# Patient Record
Sex: FEMALE | Race: WHITE
[De-identification: names, ages, dates, MRNs, and addresses within clinical notes are randomized per-mention and may not be internally consistent; named-entity substitution may affect disease eponyms.]

---

## 2019-10-01 PROBLEM — Z00.00 ENCOUNTER FOR PREVENTIVE HEALTH EXAMINATION: Status: ACTIVE | Noted: 2019-10-01

## 2019-10-02 ENCOUNTER — APPOINTMENT (OUTPATIENT)
Dept: GYNECOLOGIC ONCOLOGY | Facility: CLINIC | Age: 54
End: 2019-10-02
Payer: MEDICAID

## 2019-10-02 DIAGNOSIS — Z86.39 PERSONAL HISTORY OF OTHER ENDOCRINE, NUTRITIONAL AND METABOLIC DISEASE: ICD-10-CM

## 2019-10-02 DIAGNOSIS — Z80.0 FAMILY HISTORY OF MALIGNANT NEOPLASM OF DIGESTIVE ORGANS: ICD-10-CM

## 2019-10-02 DIAGNOSIS — Z86.2 PERSONAL HISTORY OF DISEASES OF THE BLOOD AND BLOOD-FORMING ORGANS AND CERTAIN DISORDERS INVOLVING THE IMMUNE MECHANISM: ICD-10-CM

## 2019-10-02 DIAGNOSIS — Z78.9 OTHER SPECIFIED HEALTH STATUS: ICD-10-CM

## 2019-10-02 DIAGNOSIS — Z86.79 PERSONAL HISTORY OF OTHER DISEASES OF THE CIRCULATORY SYSTEM: ICD-10-CM

## 2019-10-02 PROCEDURE — 99205 OFFICE O/P NEW HI 60 MIN: CPT

## 2019-10-02 RX ORDER — METFORMIN HYDROCHLORIDE 625 MG/1
TABLET ORAL
Refills: 0 | Status: ACTIVE | COMMUNITY

## 2019-10-02 RX ORDER — GLIPIZIDE 2.5 MG/1
TABLET ORAL
Refills: 0 | Status: ACTIVE | COMMUNITY

## 2019-10-02 RX ORDER — METOPROLOL TARTRATE 75 MG/1
TABLET, FILM COATED ORAL
Refills: 0 | Status: ACTIVE | COMMUNITY

## 2019-10-02 RX ORDER — LEVOTHYROXINE SODIUM 137 UG/1
TABLET ORAL
Refills: 0 | Status: ACTIVE | COMMUNITY

## 2019-10-02 NOTE — OB HISTORY
[Total Preg ___] : : [unfilled] [AB Spont ___] : [unfilled] miscarriage(s) [Definite ___ (Date)] : the last menstrual period was [unfilled]

## 2019-10-11 LAB
CA 125 (LABCORP): 14.2 U/ML
HE 4: 108 PMOL/L
POSTMENOPAUSAL ROMA: 2.18
PREMENOPAUSAL ROMA: 3.34
ROMA COMMENT: NORMAL

## 2019-10-14 ENCOUNTER — APPOINTMENT (OUTPATIENT)
Dept: MAMMOGRAPHY | Facility: CLINIC | Age: 54
End: 2019-10-14
Payer: MEDICAID

## 2019-10-14 ENCOUNTER — OUTPATIENT (OUTPATIENT)
Dept: OUTPATIENT SERVICES | Facility: HOSPITAL | Age: 54
LOS: 1 days | End: 2019-10-14
Payer: MEDICAID

## 2019-10-14 ENCOUNTER — APPOINTMENT (OUTPATIENT)
Dept: MRI IMAGING | Facility: CLINIC | Age: 54
End: 2019-10-14
Payer: MEDICAID

## 2019-10-14 DIAGNOSIS — N83.8 OTHER NONINFLAMMATORY DISORDERS OF OVARY, FALLOPIAN TUBE AND BROAD LIGAMENT: ICD-10-CM

## 2019-10-14 PROCEDURE — 77067 SCR MAMMO BI INCL CAD: CPT | Mod: 26

## 2019-10-14 PROCEDURE — 77063 BREAST TOMOSYNTHESIS BI: CPT | Mod: 26

## 2019-10-14 PROCEDURE — 72197 MRI PELVIS W/O & W/DYE: CPT

## 2019-10-14 PROCEDURE — 77067 SCR MAMMO BI INCL CAD: CPT

## 2019-10-14 PROCEDURE — 77063 BREAST TOMOSYNTHESIS BI: CPT

## 2019-10-14 PROCEDURE — A9585: CPT

## 2019-10-14 PROCEDURE — 72197 MRI PELVIS W/O & W/DYE: CPT | Mod: 26

## 2019-10-16 ENCOUNTER — APPOINTMENT (OUTPATIENT)
Dept: GYNECOLOGIC ONCOLOGY | Facility: CLINIC | Age: 54
End: 2019-10-16
Payer: MEDICAID

## 2019-10-16 ENCOUNTER — TRANSCRIPTION ENCOUNTER (OUTPATIENT)
Age: 54
End: 2019-10-16

## 2019-10-16 VITALS
HEIGHT: 62 IN | BODY MASS INDEX: 39.01 KG/M2 | SYSTOLIC BLOOD PRESSURE: 130 MMHG | DIASTOLIC BLOOD PRESSURE: 76 MMHG | WEIGHT: 212 LBS

## 2019-10-16 DIAGNOSIS — N83.8 OTHER NONINFLAMMATORY DISORDERS OF OVARY, FALLOPIAN TUBE AND BROAD LIGAMENT: ICD-10-CM

## 2019-10-16 DIAGNOSIS — F17.200 NICOTINE DEPENDENCE, UNSPECIFIED, UNCOMPLICATED: ICD-10-CM

## 2019-10-16 PROCEDURE — 99215 OFFICE O/P EST HI 40 MIN: CPT

## 2019-10-16 NOTE — CHIEF COMPLAINT
[FreeTextEntry1] : [place] [facility]\par \par [facility] [phone] at [address] [place] [address]\par

## 2019-10-16 NOTE — HISTORY OF PRESENT ILLNESS
[FreeTextEntry1] : Pt is a 55 yo with a left adnexal solid mass presenting for MRI findings and HUDSON score. She reports that she has a stress test coming up next week and has follow up with pediatrist and GI doctor as discussed last visit. She reports her bloating has improved and resolved. The swelling has also subsided and she generally feels better. She has no new complaints.

## 2019-10-16 NOTE — END OF VISIT
[FreeTextEntry3] : Written by Nery Kruger PA-C, acting as scribe for Dr. Calvin Carlson.\par  [FreeTextEntry2] : This note accurately reflects the work and decisions made by me.\par

## 2019-10-16 NOTE — ASSESSMENT
[FreeTextEntry1] : Pt is a 55 yo with left solid adnexal mass and pelvic lymphadenopathy in the setting of normal HUDSON, and chronic skin disease of both lower extremities.

## 2019-10-16 NOTE — LETTER BODY
[FreeTextEntry2] : Name: TI SINGH \par : 1965 \par \par Date of Visit: Oct 02, 2019 \par \par Dear Dr. Cuellar \par \par I recently saw our mutual patient, TI SINGH , in my office.\par \par Below, please see my findings.\par \par As always, it is my sincere pleasure to have the opportunity to participate in one of your patients' care. Please feel free to contact me with any questions or concerns that you may have regarding her care.\par \par Sincerely yours,\par \par Calvin Carlson MD\par

## 2019-10-16 NOTE — REVIEW OF SYSTEMS
[Negative] : Genitourinary [Bloody Stools] : no bloody stools [Diarrhea] : no diarrhea [Nausea] : no nausea/vomitting [de-identified] : + abdominal bloating

## 2019-10-16 NOTE — END OF VISIT
[FreeTextEntry3] : RTC in 1 month for US and repeat HUDSON testing before clinic [>50% of Time Spent on Counseling for ____] : Greater than 50% of the encounter time was spent on counseling for [unfilled]

## 2019-10-16 NOTE — ASSESSMENT
[FreeTextEntry1] : 53 yo with left sided adnexal mass.\par \par I discussed with the patient that I would like to obtain further testing to evaluate ovarian mass. Will obtain MRI and HUDSON. I also discussed with the patient the importance of getting her colonsocopy performed in the setting of her history of anemia. Her father passed away from colon cancer and my first worry for her would be to make sure she does not have any evidence of colon cancer. Anemia in post-menopausal woman with no evidence of bleeding is concerning for a GI loss of blood. \par \par

## 2019-10-16 NOTE — DISCUSSION/SUMMARY
[FreeTextEntry1] : Discussed at length the complicated clinical situatiuon and reviewed the HUDSON score and MRI. 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 Solid masses are usually not malignant although it cannot be completely excluded. I would give it a 10-15% chance of malignancy at this time. If the lymphadenopathy does represent malignancy it is already spread and stage III. The options presented were exploratory laparotomy with hysterectomy, BSO and possible staging as the standard of care. I also discussed minimally invasive option with robotic surgery. She could also opt for surveillance if she is motivated not to have surgery and understands that delaying surgery could result in cancer spreading. In the end, she decided to undergo stress testing and clearances and follow up in 1 month for repeat US and repeat HUDSON testing. If there is stability she would like to continue observation as she is motivated to avoid surgery. She understands increased risk of complications due to Diabetes and being a smoker.

## 2019-10-16 NOTE — PHYSICAL EXAM
[Normal] : Bimanual Exam: Normal [Abnormal] : Skin and subcutaneous tissue: Abnormal [de-identified] : scarily dry skin on bilateral legs to the level of the knee

## 2019-10-25 ENCOUNTER — NON-APPOINTMENT (OUTPATIENT)
Age: 54
End: 2019-10-25

## 2019-10-25 ENCOUNTER — APPOINTMENT (OUTPATIENT)
Dept: CARDIOLOGY | Facility: CLINIC | Age: 54
End: 2019-10-25
Payer: MEDICAID

## 2019-10-25 VITALS
HEIGHT: 62 IN | SYSTOLIC BLOOD PRESSURE: 178 MMHG | HEART RATE: 98 BPM | OXYGEN SATURATION: 95 % | DIASTOLIC BLOOD PRESSURE: 90 MMHG

## 2019-10-25 DIAGNOSIS — I50.30 UNSPECIFIED DIASTOLIC (CONGESTIVE) HEART FAILURE: ICD-10-CM

## 2019-10-25 DIAGNOSIS — F17.210 NICOTINE DEPENDENCE, CIGARETTES, UNCOMPLICATED: ICD-10-CM

## 2019-10-25 DIAGNOSIS — E11.628 TYPE 2 DIABETES MELLITUS WITH OTHER SKIN COMPLICATIONS: ICD-10-CM

## 2019-10-25 DIAGNOSIS — R60.0 LOCALIZED EDEMA: ICD-10-CM

## 2019-10-25 DIAGNOSIS — I31.3 PERICARDIAL EFFUSION (NONINFLAMMATORY): ICD-10-CM

## 2019-10-25 PROCEDURE — 93000 ELECTROCARDIOGRAM COMPLETE: CPT

## 2019-10-25 PROCEDURE — 99204 OFFICE O/P NEW MOD 45 MIN: CPT | Mod: 25

## 2019-10-25 RX ORDER — LOSARTAN POTASSIUM 50 MG/1
50 TABLET, FILM COATED ORAL
Refills: 0 | Status: ACTIVE | COMMUNITY
Start: 2019-10-25

## 2019-10-25 RX ORDER — FUROSEMIDE 40 MG/1
40 TABLET ORAL
Qty: 30 | Refills: 0 | Status: ACTIVE | COMMUNITY
Start: 2019-10-24

## 2019-10-25 NOTE — PHYSICAL EXAM
[Normal Appearance] : normal appearance [General Appearance - Well Developed] : well developed [Well Groomed] : well groomed [General Appearance - Well Nourished] : well nourished [No Deformities] : no deformities [General Appearance - In No Acute Distress] : no acute distress [Normal Conjunctiva] : the conjunctiva exhibited no abnormalities [Eyelids - No Xanthelasma] : the eyelids demonstrated no xanthelasmas [Normal Oral Mucosa] : normal oral mucosa [No Oral Pallor] : no oral pallor [Heart Rate And Rhythm] : heart rate and rhythm were normal [No Oral Cyanosis] : no oral cyanosis [Heart Sounds] : normal S1 and S2 [Murmurs] : no murmurs present [Arterial Pulses Normal] : the arterial pulses were normal [Respiration, Rhythm And Depth] : normal respiratory rhythm and effort [Auscultation Breath Sounds / Voice Sounds] : lungs were clear to auscultation bilaterally [Exaggerated Use Of Accessory Muscles For Inspiration] : no accessory muscle use [Abdomen Tenderness] : non-tender [Abdomen Soft] : soft [Abdomen Mass (___ Cm)] : no abdominal mass palpated [Abnormal Walk] : normal gait [Gait - Sufficient For Exercise Testing] : the gait was sufficient for exercise testing [Nail Clubbing] : no clubbing of the fingernails [Petechial Hemorrhages (___cm)] : no petechial hemorrhages [Cyanosis, Localized] : no localized cyanosis [] : no ischemic changes [Oriented To Time, Place, And Person] : oriented to person, place, and time [Impaired Insight] : insight and judgment were intact [Affect] : the affect was normal [Mood] : the mood was normal [No Anxiety] : not feeling anxious [FreeTextEntry1] : b/l edema

## 2019-10-25 NOTE — HISTORY OF PRESENT ILLNESS
[FreeTextEntry1] : Pt is a 53 y/o F smoker who is referred here today by their PCP for evaluation.  A few mnths ago was feeling bloated.  Her PCP (Dr Chuck Cuellar) sent her for CXR, CT chest - was found to have pericardial effusion. Sent for TTE 09/2019 EF 60%, small pericardial effusion, mod LAE, mod TR, mod pHTN.  \par She states that her breathing has gotten better after starting diuretics.  She lives a sedentary lifestyle.\par She was also sent for a treadmill stress test which she could not complete due to SOB and leg pain - suboptimal test.  She was referred for pharm nuc but she did nto want to go back \par Was seeing a cardiologist in Dr Gloria's office\par \par She states last A1c 4.9\par \par PMH: pericardial effusion, HFpEF, HTN, DM, BMI 38,  hypothyroidism\par Smoking status: 1-1.5 PPD\par social ETOH \par no drug use\par Current exercise: none\par Daily water intake: "not enough"\par Daily caffeine intake: 2-3 cups coffee\par OTC medications: none\par Family hx: no cardiac disease\par Previous cardiac testing: as mentioned\par Previous hospitalizations: none\par 0 children\par LMP about age 44

## 2019-10-25 NOTE — REVIEW OF SYSTEMS
[Negative] : Endocrine [see HPI] : see HPI [Dyspnea on exertion] : dyspnea during exertion [Lower Ext Edema] : lower extremity edema

## 2019-10-25 NOTE — DISCUSSION/SUMMARY
[FreeTextEntry1] : Pt is a 55 y/o F smoker with PMH pericardial effusion, HFpEF, HTN, DM, BMI 38,  hypothyroidism who presents today for evaluation\par c/w lasix 40mg qd\par She has been sent for cardiac CTA by PCP - will f/u results\par TTE 09/2019 EF 60%, small pericardial effusion, mod LAE, mod TR, mod pHTN.\par BP very high today but she states it is usually well controlled, she did not take her meds this morning - will monitor and titrat antiHTN PRN\par f/u 2 weeks\par The described plan was discussed with the pt.  All questions and concerns were addressed to the best of my knowledge.

## 2019-11-01 ENCOUNTER — APPOINTMENT (OUTPATIENT)
Dept: OBGYN | Facility: CLINIC | Age: 54
End: 2019-11-01

## 2019-11-05 ENCOUNTER — APPOINTMENT (OUTPATIENT)
Dept: CARDIOLOGY | Facility: CLINIC | Age: 54
End: 2019-11-05

## 2019-11-11 ENCOUNTER — APPOINTMENT (OUTPATIENT)
Dept: GASTROENTEROLOGY | Facility: CLINIC | Age: 54
End: 2019-11-11

## 2019-11-14 LAB
CA 125 (LABCORP): 18 U/ML
HE 4: 104 PMOL/L
POSTMENOPAUSAL ROMA: 2.42
PREMENOPAUSAL ROMA: 3.17
ROMA COMMENT: NORMAL

## 2019-12-18 ENCOUNTER — APPOINTMENT (OUTPATIENT)
Dept: OBGYN | Facility: CLINIC | Age: 54
End: 2019-12-18

## 2020-01-02 ENCOUNTER — APPOINTMENT (OUTPATIENT)
Dept: GYNECOLOGIC ONCOLOGY | Facility: CLINIC | Age: 55
End: 2020-01-02

## 2020-01-29 ENCOUNTER — APPOINTMENT (OUTPATIENT)
Dept: GYNECOLOGIC ONCOLOGY | Facility: CLINIC | Age: 55
End: 2020-01-29

## 2020-02-05 ENCOUNTER — APPOINTMENT (OUTPATIENT)
Dept: GYNECOLOGIC ONCOLOGY | Facility: CLINIC | Age: 55
End: 2020-02-05

## 2020-02-26 ENCOUNTER — APPOINTMENT (OUTPATIENT)
Dept: GYNECOLOGIC ONCOLOGY | Facility: CLINIC | Age: 55
End: 2020-02-26

## 2020-03-04 ENCOUNTER — APPOINTMENT (OUTPATIENT)
Dept: GYNECOLOGIC ONCOLOGY | Facility: CLINIC | Age: 55
End: 2020-03-04

## 2020-03-18 ENCOUNTER — APPOINTMENT (OUTPATIENT)
Dept: GYNECOLOGIC ONCOLOGY | Facility: CLINIC | Age: 55
End: 2020-03-18

## 2021-07-25 ENCOUNTER — INPATIENT (INPATIENT)
Facility: HOSPITAL | Age: 56
LOS: 5 days | Discharge: HOME HEALTH | DRG: 720 | End: 2021-07-31
Attending: FAMILY MEDICINE | Admitting: INTERNAL MEDICINE
Payer: MEDICAID

## 2021-07-25 VITALS
WEIGHT: 199.96 LBS | HEIGHT: 62 IN | SYSTOLIC BLOOD PRESSURE: 113 MMHG | HEART RATE: 81 BPM | TEMPERATURE: 98 F | DIASTOLIC BLOOD PRESSURE: 67 MMHG | RESPIRATION RATE: 16 BRPM | OXYGEN SATURATION: 99 %

## 2021-07-25 DIAGNOSIS — L03.119 CELLULITIS OF UNSPECIFIED PART OF LIMB: ICD-10-CM

## 2021-07-25 LAB
ALBUMIN SERPL ELPH-MCNC: 2.6 G/DL — LOW (ref 3.3–5)
ALP SERPL-CCNC: 314 U/L — HIGH (ref 40–120)
ALT FLD-CCNC: 22 U/L — SIGNIFICANT CHANGE UP (ref 12–78)
ANION GAP SERPL CALC-SCNC: 5 MMOL/L — SIGNIFICANT CHANGE UP (ref 5–17)
ANISOCYTOSIS BLD QL: SIGNIFICANT CHANGE UP
APPEARANCE UR: ABNORMAL
APTT BLD: 38.2 SEC — HIGH (ref 27.5–35.5)
AST SERPL-CCNC: 43 U/L — HIGH (ref 15–37)
BACTERIA # UR AUTO: ABNORMAL
BASE EXCESS BLDV CALC-SCNC: -19 MMOL/L — LOW (ref -2–2)
BASOPHILS # BLD AUTO: 0.05 K/UL — SIGNIFICANT CHANGE UP (ref 0–0.2)
BASOPHILS NFR BLD AUTO: 0.5 % — SIGNIFICANT CHANGE UP (ref 0–2)
BILIRUB SERPL-MCNC: 0.4 MG/DL — SIGNIFICANT CHANGE UP (ref 0.2–1.2)
BILIRUB UR-MCNC: NEGATIVE — SIGNIFICANT CHANGE UP
BUN SERPL-MCNC: 67 MG/DL — HIGH (ref 7–23)
CALCIUM SERPL-MCNC: 7.2 MG/DL — LOW (ref 8.5–10.1)
CHLORIDE SERPL-SCNC: 117 MMOL/L — HIGH (ref 96–108)
CO2 SERPL-SCNC: 12 MMOL/L — LOW (ref 22–31)
COLOR SPEC: YELLOW — SIGNIFICANT CHANGE UP
COMMENT - URINE: SIGNIFICANT CHANGE UP
CREAT SERPL-MCNC: 2.9 MG/DL — HIGH (ref 0.5–1.3)
DACRYOCYTES BLD QL SMEAR: SLIGHT — SIGNIFICANT CHANGE UP
DIFF PNL FLD: NEGATIVE — SIGNIFICANT CHANGE UP
ELLIPTOCYTES BLD QL SMEAR: SLIGHT — SIGNIFICANT CHANGE UP
EOSINOPHIL # BLD AUTO: 0.32 K/UL — SIGNIFICANT CHANGE UP (ref 0–0.5)
EOSINOPHIL NFR BLD AUTO: 3.2 % — SIGNIFICANT CHANGE UP (ref 0–6)
EPI CELLS # UR: SIGNIFICANT CHANGE UP
GLUCOSE BLDC GLUCOMTR-MCNC: 82 MG/DL — SIGNIFICANT CHANGE UP (ref 70–99)
GLUCOSE SERPL-MCNC: 42 MG/DL — CRITICAL LOW (ref 70–99)
GLUCOSE UR QL: NEGATIVE MG/DL — SIGNIFICANT CHANGE UP
HCO3 BLDV-SCNC: 9 MMOL/L — LOW (ref 21–29)
HCT VFR BLD CALC: 29.1 % — LOW (ref 34.5–45)
HGB BLD-MCNC: 8.5 G/DL — LOW (ref 11.5–15.5)
HYPOCHROMIA BLD QL: SLIGHT — SIGNIFICANT CHANGE UP
IMM GRANULOCYTES NFR BLD AUTO: 0.7 % — SIGNIFICANT CHANGE UP (ref 0–1.5)
INR BLD: 1.19 RATIO — HIGH (ref 0.88–1.16)
KETONES UR-MCNC: NEGATIVE — SIGNIFICANT CHANGE UP
LACTATE SERPL-SCNC: 0.5 MMOL/L — LOW (ref 0.7–2)
LEUKOCYTE ESTERASE UR-ACNC: NEGATIVE — SIGNIFICANT CHANGE UP
LYMPHOCYTES # BLD AUTO: 0.8 K/UL — LOW (ref 1–3.3)
LYMPHOCYTES # BLD AUTO: 8.1 % — LOW (ref 13–44)
MANUAL SMEAR VERIFICATION: SIGNIFICANT CHANGE UP
MCHC RBC-ENTMCNC: 19.7 PG — LOW (ref 27–34)
MCHC RBC-ENTMCNC: 29.2 GM/DL — LOW (ref 32–36)
MCV RBC AUTO: 67.4 FL — LOW (ref 80–100)
MICROCYTES BLD QL: SLIGHT — SIGNIFICANT CHANGE UP
MONOCYTES # BLD AUTO: 0.7 K/UL — SIGNIFICANT CHANGE UP (ref 0–0.9)
MONOCYTES NFR BLD AUTO: 7.1 % — SIGNIFICANT CHANGE UP (ref 2–14)
NEUTROPHILS # BLD AUTO: 7.98 K/UL — HIGH (ref 1.8–7.4)
NEUTROPHILS NFR BLD AUTO: 80.4 % — HIGH (ref 43–77)
NITRITE UR-MCNC: NEGATIVE — SIGNIFICANT CHANGE UP
OVALOCYTES BLD QL SMEAR: SLIGHT — SIGNIFICANT CHANGE UP
PCO2 BLDV: 33 MMHG — LOW (ref 35–50)
PH BLDV: 7.09 — CRITICAL LOW (ref 7.35–7.45)
PH UR: 5 — SIGNIFICANT CHANGE UP (ref 5–8)
PLAT MORPH BLD: NORMAL — SIGNIFICANT CHANGE UP
PLATELET # BLD AUTO: 186 K/UL — SIGNIFICANT CHANGE UP (ref 150–400)
PO2 BLDV: 117 MMHG — HIGH (ref 25–45)
POIKILOCYTOSIS BLD QL AUTO: SLIGHT — SIGNIFICANT CHANGE UP
POLYCHROMASIA BLD QL SMEAR: SLIGHT — SIGNIFICANT CHANGE UP
POTASSIUM SERPL-MCNC: 6.5 MMOL/L — CRITICAL HIGH (ref 3.5–5.3)
POTASSIUM SERPL-SCNC: 6.5 MMOL/L — CRITICAL HIGH (ref 3.5–5.3)
PROT SERPL-MCNC: 8.6 GM/DL — HIGH (ref 6–8.3)
PROT UR-MCNC: 30 MG/DL
PROTHROM AB SERPL-ACNC: 13.8 SEC — HIGH (ref 10.6–13.6)
RBC # BLD: 4.32 M/UL — SIGNIFICANT CHANGE UP (ref 3.8–5.2)
RBC # FLD: 20.7 % — HIGH (ref 10.3–14.5)
RBC BLD AUTO: ABNORMAL
RBC CASTS # UR COMP ASSIST: SIGNIFICANT CHANGE UP /HPF (ref 0–4)
SAO2 % BLDV: 97 % — HIGH (ref 67–88)
SODIUM SERPL-SCNC: 134 MMOL/L — LOW (ref 135–145)
SP GR SPEC: 1.02 — SIGNIFICANT CHANGE UP (ref 1.01–1.02)
TARGETS BLD QL SMEAR: SLIGHT — SIGNIFICANT CHANGE UP
UROBILINOGEN FLD QL: NEGATIVE MG/DL — SIGNIFICANT CHANGE UP
WBC # BLD: 9.92 K/UL — SIGNIFICANT CHANGE UP (ref 3.8–10.5)
WBC # FLD AUTO: 9.92 K/UL — SIGNIFICANT CHANGE UP (ref 3.8–10.5)
WBC UR QL: SIGNIFICANT CHANGE UP

## 2021-07-25 PROCEDURE — 94640 AIRWAY INHALATION TREATMENT: CPT

## 2021-07-25 PROCEDURE — 97116 GAIT TRAINING THERAPY: CPT | Mod: GP

## 2021-07-25 PROCEDURE — 82272 OCCULT BLD FECES 1-3 TESTS: CPT

## 2021-07-25 PROCEDURE — 84156 ASSAY OF PROTEIN URINE: CPT

## 2021-07-25 PROCEDURE — 83036 HEMOGLOBIN GLYCOSYLATED A1C: CPT

## 2021-07-25 PROCEDURE — 84155 ASSAY OF PROTEIN SERUM: CPT

## 2021-07-25 PROCEDURE — 82330 ASSAY OF CALCIUM: CPT

## 2021-07-25 PROCEDURE — 83735 ASSAY OF MAGNESIUM: CPT

## 2021-07-25 PROCEDURE — 86769 SARS-COV-2 COVID-19 ANTIBODY: CPT

## 2021-07-25 PROCEDURE — 82607 VITAMIN B-12: CPT

## 2021-07-25 PROCEDURE — 82962 GLUCOSE BLOOD TEST: CPT

## 2021-07-25 PROCEDURE — 84165 PROTEIN E-PHORESIS SERUM: CPT

## 2021-07-25 PROCEDURE — 86803 HEPATITIS C AB TEST: CPT

## 2021-07-25 PROCEDURE — 87493 C DIFF AMPLIFIED PROBE: CPT

## 2021-07-25 PROCEDURE — 74176 CT ABD & PELVIS W/O CONTRAST: CPT | Mod: 26

## 2021-07-25 PROCEDURE — 86335 IMMUNFIX E-PHORSIS/URINE/CSF: CPT

## 2021-07-25 PROCEDURE — 99291 CRITICAL CARE FIRST HOUR: CPT

## 2021-07-25 PROCEDURE — 80069 RENAL FUNCTION PANEL: CPT

## 2021-07-25 PROCEDURE — 83550 IRON BINDING TEST: CPT

## 2021-07-25 PROCEDURE — 85730 THROMBOPLASTIN TIME PARTIAL: CPT

## 2021-07-25 PROCEDURE — 93010 ELECTROCARDIOGRAM REPORT: CPT

## 2021-07-25 PROCEDURE — 82310 ASSAY OF CALCIUM: CPT

## 2021-07-25 PROCEDURE — 71045 X-RAY EXAM CHEST 1 VIEW: CPT | Mod: 26

## 2021-07-25 PROCEDURE — 82728 ASSAY OF FERRITIN: CPT

## 2021-07-25 PROCEDURE — 82550 ASSAY OF CK (CPK): CPT

## 2021-07-25 PROCEDURE — 80048 BASIC METABOLIC PNL TOTAL CA: CPT

## 2021-07-25 PROCEDURE — 80202 ASSAY OF VANCOMYCIN: CPT

## 2021-07-25 PROCEDURE — 93005 ELECTROCARDIOGRAM TRACING: CPT

## 2021-07-25 PROCEDURE — 36415 COLL VENOUS BLD VENIPUNCTURE: CPT

## 2021-07-25 PROCEDURE — 84100 ASSAY OF PHOSPHORUS: CPT

## 2021-07-25 PROCEDURE — 85610 PROTHROMBIN TIME: CPT

## 2021-07-25 PROCEDURE — 74176 CT ABD & PELVIS W/O CONTRAST: CPT

## 2021-07-25 PROCEDURE — 84681 ASSAY OF C-PEPTIDE: CPT

## 2021-07-25 PROCEDURE — 82306 VITAMIN D 25 HYDROXY: CPT

## 2021-07-25 PROCEDURE — 86334 IMMUNOFIX E-PHORESIS SERUM: CPT

## 2021-07-25 PROCEDURE — 84484 ASSAY OF TROPONIN QUANT: CPT

## 2021-07-25 PROCEDURE — 85027 COMPLETE CBC AUTOMATED: CPT

## 2021-07-25 PROCEDURE — 93970 EXTREMITY STUDY: CPT

## 2021-07-25 PROCEDURE — 83540 ASSAY OF IRON: CPT

## 2021-07-25 PROCEDURE — 83970 ASSAY OF PARATHORMONE: CPT

## 2021-07-25 PROCEDURE — 93925 LOWER EXTREMITY STUDY: CPT

## 2021-07-25 RX ORDER — ALBUTEROL 90 UG/1
2 AEROSOL, METERED ORAL ONCE
Refills: 0 | Status: COMPLETED | OUTPATIENT
Start: 2021-07-25 | End: 2021-07-25

## 2021-07-25 RX ORDER — CEFTRIAXONE 500 MG/1
1000 INJECTION, POWDER, FOR SOLUTION INTRAMUSCULAR; INTRAVENOUS EVERY 24 HOURS
Refills: 0 | Status: DISCONTINUED | OUTPATIENT
Start: 2021-07-25 | End: 2021-07-25

## 2021-07-25 RX ORDER — SODIUM CHLORIDE 9 MG/ML
2000 INJECTION INTRAMUSCULAR; INTRAVENOUS; SUBCUTANEOUS ONCE
Refills: 0 | Status: COMPLETED | OUTPATIENT
Start: 2021-07-25 | End: 2021-07-25

## 2021-07-25 RX ORDER — CALCIUM GLUCONATE 100 MG/ML
1 VIAL (ML) INTRAVENOUS ONCE
Refills: 0 | Status: COMPLETED | OUTPATIENT
Start: 2021-07-25 | End: 2021-07-25

## 2021-07-25 RX ORDER — SODIUM BICARBONATE 1 MEQ/ML
0.33 SYRINGE (ML) INTRAVENOUS
Qty: 150 | Refills: 0 | Status: DISCONTINUED | OUTPATIENT
Start: 2021-07-25 | End: 2021-07-26

## 2021-07-25 RX ORDER — CEFTRIAXONE 500 MG/1
1000 INJECTION, POWDER, FOR SOLUTION INTRAMUSCULAR; INTRAVENOUS ONCE
Refills: 0 | Status: DISCONTINUED | OUTPATIENT
Start: 2021-07-25 | End: 2021-07-25

## 2021-07-25 RX ORDER — CHLORHEXIDINE GLUCONATE 213 G/1000ML
1 SOLUTION TOPICAL
Refills: 0 | Status: DISCONTINUED | OUTPATIENT
Start: 2021-07-25 | End: 2021-07-31

## 2021-07-25 RX ORDER — CEFTRIAXONE 500 MG/1
1000 INJECTION, POWDER, FOR SOLUTION INTRAMUSCULAR; INTRAVENOUS ONCE
Refills: 0 | Status: COMPLETED | OUTPATIENT
Start: 2021-07-25 | End: 2021-07-25

## 2021-07-25 RX ORDER — CEFTRIAXONE 500 MG/1
1000 INJECTION, POWDER, FOR SOLUTION INTRAMUSCULAR; INTRAVENOUS
Refills: 0 | Status: DISCONTINUED | OUTPATIENT
Start: 2021-07-25 | End: 2021-07-29

## 2021-07-25 RX ORDER — VANCOMYCIN HCL 1 G
1250 VIAL (EA) INTRAVENOUS ONCE
Refills: 0 | Status: COMPLETED | OUTPATIENT
Start: 2021-07-25 | End: 2021-07-25

## 2021-07-25 RX ORDER — SODIUM BICARBONATE 1 MEQ/ML
50 SYRINGE (ML) INTRAVENOUS ONCE
Refills: 0 | Status: COMPLETED | OUTPATIENT
Start: 2021-07-25 | End: 2021-07-25

## 2021-07-25 RX ADMIN — Medication 50 MILLILITER(S): at 22:52

## 2021-07-25 RX ADMIN — CEFTRIAXONE 1000 MILLIGRAM(S): 500 INJECTION, POWDER, FOR SOLUTION INTRAMUSCULAR; INTRAVENOUS at 21:27

## 2021-07-25 RX ADMIN — Medication 100 GRAM(S): at 23:03

## 2021-07-25 RX ADMIN — SODIUM CHLORIDE 2000 MILLILITER(S): 9 INJECTION INTRAMUSCULAR; INTRAVENOUS; SUBCUTANEOUS at 21:53

## 2021-07-25 RX ADMIN — ALBUTEROL 2 PUFF(S): 90 AEROSOL, METERED ORAL at 23:09

## 2021-07-25 NOTE — H&P ADULT - NSHPPHYSICALEXAM_GEN_ALL_CORE
General: obese, poorly kept. odorous.   Head: Normocephalic, atraumatic.   EENT: Sclera white. PERRL, EOM intact. Secretions normal. no cervical lymphadenopathy  PULM: Breath sounds clear to auscultation symmetrically throughout all lung fields. No wheezing, rhonchi, or rales. No use of accessory muscles.  CVS: Regular rate and rhythm. No murmurs or rubs. Pulses 2+ on upper and lower extremities bilaterally.   GI: nondistended with bowel sounds normoactive in all four quadrants. No tenderness to light or deep palpation.   Neuro:  A&OX 3. does not participate completely in medical history.   Skin: severe chronic venous stasis growths on both lower extremities with areas of wet discharge.

## 2021-07-25 NOTE — ED ADULT TRIAGE NOTE - CHIEF COMPLAINT QUOTE
Pt BIBEMS from home. called ambulance for lightheadedness, slurred speech. upon EMS arrival pt with blood glucose 56. received 1 amp dextrose PTA. bgm 84 in triage. pt is awake and alert. slurred speech and dizziness have resolved upon ED arrival.

## 2021-07-25 NOTE — ED PROVIDER NOTE - MUSCULOSKELETAL, MLM
Spine appears normal, range of motion is not limited, no muscle or joint tenderness +crusting to bilateral lower extremities  with mild swelling and pitting edema

## 2021-07-25 NOTE — ED PROVIDER NOTE - CARE PLAN
Principal Discharge DX:	Cellulitis of lower extremity, unspecified laterality  Secondary Diagnosis:	Hypoglycemia  Secondary Diagnosis:	Hyperkalemia  Secondary Diagnosis:	NEIL (acute kidney injury)

## 2021-07-25 NOTE — ED PROVIDER NOTE - CRITICAL CARE ATTENDING CONTRIBUTION TO CARE
Time spent discussing with consultants and patient and determining plan of care and interpreting labs.

## 2021-07-25 NOTE — ED PROVIDER NOTE - PROGRESS NOTE DETAILS
ICU consulted and to accept.  Pt reports she takes potassium supplements.  Dosed with calcium gluconate, bicarb, and albuterol.  Would not provide insulin given recurrent hypoglyemia.  Pt tolerating PO.  Perhaps etiology is sepsis from underlying skin infection.  ICU accepts case.  Further care per inpatient treatment team.

## 2021-07-25 NOTE — ED PROVIDER NOTE - OBJECTIVE STATEMENT
57 y/o female presents to ED for episode of hypoglycemia. Pt states she was slurring her speech, EMS checked bgm and it was in 50s, was given amp of d50 with immediate improvement. Pt felt at baseline after, states she hasn't been eating all day. Denies any sx currently other than mild diarrhea. On glipizide, took normal dose this morning and metformin. Not on insulin.

## 2021-07-25 NOTE — H&P ADULT - ASSESSMENT
57 y/o female with pmhx of DM now with:    1. acute renal failure  2. hypoglycemia  3. cellulitis  4. hyperkalemia      NEURO: mentating well. slurred speech resolved after D50  CVS: HD stable.  PULM: no active issues  GI: no active issues  RENAL: check CT abd/pelv r/o obstruction. likely related to dehydration. received 2 liters NS in ER. start on bicarb gtt. repeat bmp pending. if continues to worsen may end up requiring HD.   ID: cultures pending. UA pending. received rocephin and vanco in ER. lactate normal. afebrile without wbc but severe LE celluitis. start on rocephin for now. f/u with derm outpatient.    ENDO: hypoglycemia ? accidental with glipizide or related to sepsis? q1 hour finger sticks for now until stable. receiving D5 with bicarb gtt. check cpeptide levels.  HEME: heparin subq for dvt prophylaxis  DISPO: full code    35 minutes of critical care time spent providing medical care for patient's acute illness/conditions that impairs at least one vital organ system and/or poses a high risk of imminent or life threatening deterioration in the patient's condition. It includes time spent evaluating and treating the patient's acute illness as well as time spent reviewing labs, radiology, discussing goals of care with patient and/or patient's family, and discussing the case with a multidisciplinary team in an effort to prevent further life threatening deterioration or end organ damage. This time is independent of any procedures performed. 55 y/o female with pmhx of DM now with:    1. acute renal failure  2. hypoglycemia  3. cellulitis  4. hyperkalemia      NEURO: mentating well. slurred speech resolved after D50  CVS: HD stable.  PULM: no active issues  GI: no active issues  RENAL: check CT abd/pelv r/o obstruction. likely related to dehydration. received 2 liters NS in ER. start on bicarb gtt. repeat bmp pending. if continues to worsen may end up requiring HD.   ID: cultures pending. UA pending. received rocephin and vanco in ER. lactate normal. afebrile without wbc but severe LE celluitis. start on rocephin for now. f/u with derm outpatient.    ENDO: hypoglycemia ? accidental with glipizide or related to sepsis? q1 hour finger sticks for now until stable. receiving D5 with bicarb gtt. check cpeptide levels.  HEME: heparin subq for dvt prophylaxis  DISPO: full code    addendum 1: repeat bmp in ER with worsening hyperkalemia and Cr. arriola placed for critical I&O. give two amps d50 followed by insulin given recent hypoglycemia, lokelma, bicarb, increase bicarb gtt to 200, it was just started. will repeat bmp in 4 hours if no improvement may need HD overnight.    addendum 2: f/u bmp with improving potassium and acidosis though potassium remains elevated. gave additional dose of insulin/D50. started albuterol/budesonide/solumedrol as patient is wheezing a lot possible COPD flare. albuterol will also help with hyperK. urine output 40-50 cc/hr. repeat labs ordered for 9 am.    45minutes of critical care time spent providing medical care for patient's acute illness/conditions that impairs at least one vital organ system and/or poses a high risk of imminent or life threatening deterioration in the patient's condition. It includes time spent evaluating and treating the patient's acute illness as well as time spent reviewing labs, radiology, discussing goals of care with patient and/or patient's family, and discussing the case with a multidisciplinary team in an effort to prevent further life threatening deterioration or end organ damage. This time is independent of any procedures performed.

## 2021-07-25 NOTE — ED ADULT NURSE REASSESSMENT NOTE - NS ED NURSE REASSESS COMMENT FT1
Pt received at 2200. Pt AA&Ox4. Pt denies CP, SOB. Pt states she is having abdominal pain and diarrhea.

## 2021-07-25 NOTE — ED PROVIDER NOTE - ENMT, MLM
Airway patent, Nasal mucosa clear. Mouth with normal mucosa. Throat has no vesicles, no oropharyngeal exudates and uvula is midline.
Face Mask

## 2021-07-25 NOTE — H&P ADULT - HISTORY OF PRESENT ILLNESS
55 y/o female, poor historian, with pmhx of DM on glipizide presents to ER c/o weakness and lethargy. Found to be hypoglycemic to 50 in field, received amp of D50 and improved to 84 by arrival to ER. States she has been having diarrhea for last few days. Denies any changes in urination, abdominal pain, shortness of breath, chest pain, headache, fevers, cough.    States she took normal dose of glipizide this morning and has not had decreased PO intake. Denies any substance abuse or etoh abuse. Very poorly kept with severe venous stasis dermatitis. was on chronic keflex and states it was helping but she stopped taking it.     Found to be in acute renal failure with ph of 7.09, Cr 2.9, K 6.5, serum CO2 of 12. received calcium, albuterol, and bicarb in ER.

## 2021-07-25 NOTE — ED ADULT NURSE NOTE - OBJECTIVE STATEMENT
Patient BIBA from home complaining of hypoglycemia. Pt states she started experiencing lightheadedness, slurred speech. upon EMS arrival pt with blood glucose 56. received 1 amp dextrose PTA. bgm 84 in triage. pt is awake and alert. slurred speech and dizziness have resolved upon ED arrival. Patient states she is on both metformin and glipizide. Patient states she did not eat much today. Patient denies CP, SOB, fever, chills, dizziness. Patient BIBA from home complaining of hypoglycemia. Pt states she started experiencing lightheadedness, slurred speech. upon EMS arrival pt with blood glucose 56. received 1 amp dextrose PTA. bgm 84 in triage. pt is awake and alert. slurred speech and dizziness have resolved upon ED arrival. Patient states she is on both metformin and glipizide. Patient states she did not eat much today. Patient denies CP, SOB, fever, chills, dizziness. B/L crusting wounds, swelling. Patient states she was treated with oral ABX, wounds were improving, did not finish treatment.

## 2021-07-25 NOTE — ED PROVIDER NOTE - CLINICAL SUMMARY MEDICAL DECISION MAKING FREE TEXT BOX
Appears to have bilateral lower extremities cellulitis, discontinued keflex early despite initial improvement, labs with significant abnormalities including hyperkalemia, NEIL, recurrent hypoglycemia and low bicarb with acidosis, pt will require admission for further work up

## 2021-07-25 NOTE — H&P ADULT - REASON FOR ADMISSION
This RN was at the bedside continuously monitoring FHR from the time pushing began at 0100 to delivery of viable female infant at 0159. Apgars 9/9   acute renal failure

## 2021-07-26 LAB
ANION GAP SERPL CALC-SCNC: 5 MMOL/L — SIGNIFICANT CHANGE UP (ref 5–17)
ANION GAP SERPL CALC-SCNC: 6 MMOL/L — SIGNIFICANT CHANGE UP (ref 5–17)
ANION GAP SERPL CALC-SCNC: 8 MMOL/L — SIGNIFICANT CHANGE UP (ref 5–17)
ANION GAP SERPL CALC-SCNC: 9 MMOL/L — SIGNIFICANT CHANGE UP (ref 5–17)
BUN SERPL-MCNC: 61 MG/DL — HIGH (ref 7–23)
BUN SERPL-MCNC: 63 MG/DL — HIGH (ref 7–23)
BUN SERPL-MCNC: 64 MG/DL — HIGH (ref 7–23)
BUN SERPL-MCNC: 65 MG/DL — HIGH (ref 7–23)
BUN SERPL-MCNC: 65 MG/DL — HIGH (ref 7–23)
BUN SERPL-MCNC: 67 MG/DL — HIGH (ref 7–23)
C PEPTIDE SERPL-MCNC: 10.1 NG/ML — HIGH (ref 1.1–4.4)
CALCIUM SERPL-MCNC: 6.5 MG/DL — CRITICAL LOW (ref 8.5–10.1)
CALCIUM SERPL-MCNC: 6.9 MG/DL — LOW (ref 8.5–10.1)
CALCIUM SERPL-MCNC: 6.9 MG/DL — LOW (ref 8.5–10.1)
CALCIUM SERPL-MCNC: 7.1 MG/DL — LOW (ref 8.5–10.1)
CALCIUM SERPL-MCNC: 7.1 MG/DL — LOW (ref 8.5–10.1)
CALCIUM SERPL-MCNC: 7.2 MG/DL — LOW (ref 8.5–10.1)
CHLORIDE SERPL-SCNC: 104 MMOL/L — SIGNIFICANT CHANGE UP (ref 96–108)
CHLORIDE SERPL-SCNC: 111 MMOL/L — HIGH (ref 96–108)
CHLORIDE SERPL-SCNC: 111 MMOL/L — HIGH (ref 96–108)
CHLORIDE SERPL-SCNC: 113 MMOL/L — HIGH (ref 96–108)
CHLORIDE SERPL-SCNC: 115 MMOL/L — HIGH (ref 96–108)
CHLORIDE SERPL-SCNC: 118 MMOL/L — HIGH (ref 96–108)
CO2 SERPL-SCNC: 11 MMOL/L — LOW (ref 22–31)
CO2 SERPL-SCNC: 15 MMOL/L — LOW (ref 22–31)
CO2 SERPL-SCNC: 16 MMOL/L — LOW (ref 22–31)
CO2 SERPL-SCNC: 18 MMOL/L — LOW (ref 22–31)
CO2 SERPL-SCNC: 19 MMOL/L — LOW (ref 22–31)
CO2 SERPL-SCNC: 26 MMOL/L — SIGNIFICANT CHANGE UP (ref 22–31)
COVID-19 SPIKE DOMAIN AB INTERP: NEGATIVE — SIGNIFICANT CHANGE UP
COVID-19 SPIKE DOMAIN ANTIBODY RESULT: 0.4 U/ML — SIGNIFICANT CHANGE UP
CREAT SERPL-MCNC: 2.61 MG/DL — HIGH (ref 0.5–1.3)
CREAT SERPL-MCNC: 2.62 MG/DL — HIGH (ref 0.5–1.3)
CREAT SERPL-MCNC: 2.69 MG/DL — HIGH (ref 0.5–1.3)
CREAT SERPL-MCNC: 2.7 MG/DL — HIGH (ref 0.5–1.3)
CREAT SERPL-MCNC: 2.88 MG/DL — HIGH (ref 0.5–1.3)
CREAT SERPL-MCNC: 2.98 MG/DL — HIGH (ref 0.5–1.3)
GLUCOSE SERPL-MCNC: 161 MG/DL — HIGH (ref 70–99)
GLUCOSE SERPL-MCNC: 180 MG/DL — HIGH (ref 70–99)
GLUCOSE SERPL-MCNC: 251 MG/DL — HIGH (ref 70–99)
GLUCOSE SERPL-MCNC: 309 MG/DL — HIGH (ref 70–99)
GLUCOSE SERPL-MCNC: 598 MG/DL — CRITICAL HIGH (ref 70–99)
GLUCOSE SERPL-MCNC: 80 MG/DL — SIGNIFICANT CHANGE UP (ref 70–99)
MAGNESIUM SERPL-MCNC: 1.8 MG/DL — SIGNIFICANT CHANGE UP (ref 1.6–2.6)
MAGNESIUM SERPL-MCNC: 1.9 MG/DL — SIGNIFICANT CHANGE UP (ref 1.6–2.6)
MAGNESIUM SERPL-MCNC: 2 MG/DL — SIGNIFICANT CHANGE UP (ref 1.6–2.6)
PHOSPHATE SERPL-MCNC: 5.5 MG/DL — HIGH (ref 2.5–4.5)
PHOSPHATE SERPL-MCNC: 6.3 MG/DL — HIGH (ref 2.5–4.5)
PHOSPHATE SERPL-MCNC: 6.9 MG/DL — HIGH (ref 2.5–4.5)
POTASSIUM SERPL-MCNC: 4.9 MMOL/L — SIGNIFICANT CHANGE UP (ref 3.5–5.3)
POTASSIUM SERPL-MCNC: 5.4 MMOL/L — HIGH (ref 3.5–5.3)
POTASSIUM SERPL-MCNC: 6.1 MMOL/L — HIGH (ref 3.5–5.3)
POTASSIUM SERPL-MCNC: 6.3 MMOL/L — CRITICAL HIGH (ref 3.5–5.3)
POTASSIUM SERPL-MCNC: 6.6 MMOL/L — CRITICAL HIGH (ref 3.5–5.3)
POTASSIUM SERPL-MCNC: 7 MMOL/L — CRITICAL HIGH (ref 3.5–5.3)
POTASSIUM SERPL-SCNC: 4.9 MMOL/L — SIGNIFICANT CHANGE UP (ref 3.5–5.3)
POTASSIUM SERPL-SCNC: 5.4 MMOL/L — HIGH (ref 3.5–5.3)
POTASSIUM SERPL-SCNC: 6.1 MMOL/L — HIGH (ref 3.5–5.3)
POTASSIUM SERPL-SCNC: 6.3 MMOL/L — CRITICAL HIGH (ref 3.5–5.3)
POTASSIUM SERPL-SCNC: 6.6 MMOL/L — CRITICAL HIGH (ref 3.5–5.3)
POTASSIUM SERPL-SCNC: 7 MMOL/L — CRITICAL HIGH (ref 3.5–5.3)
RAPID RVP RESULT: SIGNIFICANT CHANGE UP
SARS-COV-2 IGG+IGM SERPL QL IA: 0.4 U/ML — SIGNIFICANT CHANGE UP
SARS-COV-2 IGG+IGM SERPL QL IA: NEGATIVE — SIGNIFICANT CHANGE UP
SARS-COV-2 RNA SPEC QL NAA+PROBE: SIGNIFICANT CHANGE UP
SODIUM SERPL-SCNC: 135 MMOL/L — SIGNIFICANT CHANGE UP (ref 135–145)
SODIUM SERPL-SCNC: 136 MMOL/L — SIGNIFICANT CHANGE UP (ref 135–145)
SODIUM SERPL-SCNC: 138 MMOL/L — SIGNIFICANT CHANGE UP (ref 135–145)
SODIUM SERPL-SCNC: 138 MMOL/L — SIGNIFICANT CHANGE UP (ref 135–145)
TROPONIN I SERPL-MCNC: 0.06 NG/ML — HIGH (ref 0.01–0.04)
TROPONIN I SERPL-MCNC: 0.13 NG/ML — HIGH (ref 0.01–0.04)
VANCOMYCIN TROUGH SERPL-MCNC: 14.7 UG/ML — SIGNIFICANT CHANGE UP (ref 10–20)

## 2021-07-26 PROCEDURE — 93010 ELECTROCARDIOGRAM REPORT: CPT

## 2021-07-26 PROCEDURE — 93925 LOWER EXTREMITY STUDY: CPT | Mod: 26

## 2021-07-26 PROCEDURE — 93970 EXTREMITY STUDY: CPT | Mod: 26

## 2021-07-26 PROCEDURE — 99222 1ST HOSP IP/OBS MODERATE 55: CPT

## 2021-07-26 PROCEDURE — 99291 CRITICAL CARE FIRST HOUR: CPT

## 2021-07-26 RX ORDER — DEXTROSE 50 % IN WATER 50 %
12.5 SYRINGE (ML) INTRAVENOUS ONCE
Refills: 0 | Status: DISCONTINUED | OUTPATIENT
Start: 2021-07-26 | End: 2021-07-31

## 2021-07-26 RX ORDER — SODIUM CHLORIDE 9 MG/ML
1000 INJECTION, SOLUTION INTRAVENOUS
Refills: 0 | Status: DISCONTINUED | OUTPATIENT
Start: 2021-07-26 | End: 2021-07-31

## 2021-07-26 RX ORDER — CALCIUM GLUCONATE 100 MG/ML
1 VIAL (ML) INTRAVENOUS ONCE
Refills: 0 | Status: COMPLETED | OUTPATIENT
Start: 2021-07-26 | End: 2021-07-26

## 2021-07-26 RX ORDER — BUDESONIDE AND FORMOTEROL FUMARATE DIHYDRATE 160; 4.5 UG/1; UG/1
2 AEROSOL RESPIRATORY (INHALATION)
Refills: 0 | Status: DISCONTINUED | OUTPATIENT
Start: 2021-07-26 | End: 2021-07-31

## 2021-07-26 RX ORDER — DEXTROSE 50 % IN WATER 50 %
15 SYRINGE (ML) INTRAVENOUS ONCE
Refills: 0 | Status: DISCONTINUED | OUTPATIENT
Start: 2021-07-26 | End: 2021-07-31

## 2021-07-26 RX ORDER — DEXTROSE 50 % IN WATER 50 %
50 SYRINGE (ML) INTRAVENOUS ONCE
Refills: 0 | Status: COMPLETED | OUTPATIENT
Start: 2021-07-26 | End: 2021-07-26

## 2021-07-26 RX ORDER — SOD,AMMONIUM,POTASSIUM LACTATE
1 CREAM (GRAM) TOPICAL
Refills: 0 | Status: DISCONTINUED | OUTPATIENT
Start: 2021-07-26 | End: 2021-07-31

## 2021-07-26 RX ORDER — SODIUM ZIRCONIUM CYCLOSILICATE 10 G/10G
10 POWDER, FOR SUSPENSION ORAL ONCE
Refills: 0 | Status: COMPLETED | OUTPATIENT
Start: 2021-07-26 | End: 2021-07-26

## 2021-07-26 RX ORDER — INSULIN HUMAN 100 [IU]/ML
10 INJECTION, SOLUTION SUBCUTANEOUS ONCE
Refills: 0 | Status: COMPLETED | OUTPATIENT
Start: 2021-07-26 | End: 2021-07-26

## 2021-07-26 RX ORDER — NYSTATIN CREAM 100000 [USP'U]/G
1 CREAM TOPICAL
Refills: 0 | Status: DISCONTINUED | OUTPATIENT
Start: 2021-07-26 | End: 2021-07-31

## 2021-07-26 RX ORDER — DEXTROSE 50 % IN WATER 50 %
25 SYRINGE (ML) INTRAVENOUS ONCE
Refills: 0 | Status: DISCONTINUED | OUTPATIENT
Start: 2021-07-26 | End: 2021-07-31

## 2021-07-26 RX ORDER — SODIUM POLYSTYRENE SULFONATE 4.1 MEQ/G
30 POWDER, FOR SUSPENSION ORAL ONCE
Refills: 0 | Status: COMPLETED | OUTPATIENT
Start: 2021-07-26 | End: 2021-07-26

## 2021-07-26 RX ORDER — VANCOMYCIN HCL 1 G
1000 VIAL (EA) INTRAVENOUS DAILY
Refills: 0 | Status: DISCONTINUED | OUTPATIENT
Start: 2021-07-26 | End: 2021-07-26

## 2021-07-26 RX ORDER — SODIUM BICARBONATE 1 MEQ/ML
50 SYRINGE (ML) INTRAVENOUS ONCE
Refills: 0 | Status: COMPLETED | OUTPATIENT
Start: 2021-07-26 | End: 2021-07-26

## 2021-07-26 RX ORDER — SODIUM BICARBONATE 1 MEQ/ML
0.17 SYRINGE (ML) INTRAVENOUS
Qty: 150 | Refills: 0 | Status: DISCONTINUED | OUTPATIENT
Start: 2021-07-26 | End: 2021-07-28

## 2021-07-26 RX ORDER — MAGNESIUM SULFATE 500 MG/ML
1 VIAL (ML) INJECTION ONCE
Refills: 0 | Status: COMPLETED | OUTPATIENT
Start: 2021-07-26 | End: 2021-07-26

## 2021-07-26 RX ORDER — GLUCAGON INJECTION, SOLUTION 0.5 MG/.1ML
1 INJECTION, SOLUTION SUBCUTANEOUS ONCE
Refills: 0 | Status: DISCONTINUED | OUTPATIENT
Start: 2021-07-26 | End: 2021-07-31

## 2021-07-26 RX ORDER — INSULIN HUMAN 100 [IU]/ML
5 INJECTION, SOLUTION SUBCUTANEOUS ONCE
Refills: 0 | Status: COMPLETED | OUTPATIENT
Start: 2021-07-26 | End: 2021-07-26

## 2021-07-26 RX ORDER — SODIUM CHLORIDE 9 MG/ML
1000 INJECTION INTRAMUSCULAR; INTRAVENOUS; SUBCUTANEOUS ONCE
Refills: 0 | Status: COMPLETED | OUTPATIENT
Start: 2021-07-26 | End: 2021-07-26

## 2021-07-26 RX ORDER — INSULIN LISPRO 100/ML
VIAL (ML) SUBCUTANEOUS
Refills: 0 | Status: DISCONTINUED | OUTPATIENT
Start: 2021-07-26 | End: 2021-07-27

## 2021-07-26 RX ORDER — ALBUTEROL 90 UG/1
2 AEROSOL, METERED ORAL EVERY 6 HOURS
Refills: 0 | Status: DISCONTINUED | OUTPATIENT
Start: 2021-07-26 | End: 2021-07-31

## 2021-07-26 RX ORDER — DEXTROSE 50 % IN WATER 50 %
25 SYRINGE (ML) INTRAVENOUS ONCE
Refills: 0 | Status: COMPLETED | OUTPATIENT
Start: 2021-07-26 | End: 2021-07-26

## 2021-07-26 RX ORDER — HEPARIN SODIUM 5000 [USP'U]/ML
5000 INJECTION INTRAVENOUS; SUBCUTANEOUS EVERY 8 HOURS
Refills: 0 | Status: DISCONTINUED | OUTPATIENT
Start: 2021-07-26 | End: 2021-07-31

## 2021-07-26 RX ADMIN — Medication 250 MILLIGRAM(S): at 00:43

## 2021-07-26 RX ADMIN — Medication 125 MILLIGRAM(S): at 05:25

## 2021-07-26 RX ADMIN — Medication 100 GRAM(S): at 00:49

## 2021-07-26 RX ADMIN — Medication 6: at 23:06

## 2021-07-26 RX ADMIN — CHLORHEXIDINE GLUCONATE 1 APPLICATION(S): 213 SOLUTION TOPICAL at 05:00

## 2021-07-26 RX ADMIN — INSULIN HUMAN 10 UNIT(S): 100 INJECTION, SOLUTION SUBCUTANEOUS at 05:07

## 2021-07-26 RX ADMIN — SODIUM POLYSTYRENE SULFONATE 30 GRAM(S): 4.1 POWDER, FOR SUSPENSION ORAL at 15:28

## 2021-07-26 RX ADMIN — Medication 40 MILLIGRAM(S): at 13:48

## 2021-07-26 RX ADMIN — SODIUM CHLORIDE 2000 MILLILITER(S): 9 INJECTION INTRAMUSCULAR; INTRAVENOUS; SUBCUTANEOUS at 01:30

## 2021-07-26 RX ADMIN — Medication 200 MEQ/KG/HR: at 05:21

## 2021-07-26 RX ADMIN — Medication 100 MEQ/KG/HR: at 13:03

## 2021-07-26 RX ADMIN — SODIUM CHLORIDE 1000 MILLILITER(S): 9 INJECTION INTRAMUSCULAR; INTRAVENOUS; SUBCUTANEOUS at 05:25

## 2021-07-26 RX ADMIN — HEPARIN SODIUM 5000 UNIT(S): 5000 INJECTION INTRAVENOUS; SUBCUTANEOUS at 05:08

## 2021-07-26 RX ADMIN — HEPARIN SODIUM 5000 UNIT(S): 5000 INJECTION INTRAVENOUS; SUBCUTANEOUS at 23:03

## 2021-07-26 RX ADMIN — ALBUTEROL 2 PUFF(S): 90 AEROSOL, METERED ORAL at 09:53

## 2021-07-26 RX ADMIN — SODIUM ZIRCONIUM CYCLOSILICATE 10 GRAM(S): 10 POWDER, FOR SUSPENSION ORAL at 00:44

## 2021-07-26 RX ADMIN — INSULIN HUMAN 5 UNIT(S): 100 INJECTION, SOLUTION SUBCUTANEOUS at 00:41

## 2021-07-26 RX ADMIN — Medication 200 MEQ/KG/HR: at 00:44

## 2021-07-26 RX ADMIN — Medication 50 GRAM(S): at 00:41

## 2021-07-26 RX ADMIN — Medication 100 GRAM(S): at 05:21

## 2021-07-26 RX ADMIN — Medication 10: at 17:11

## 2021-07-26 RX ADMIN — SODIUM POLYSTYRENE SULFONATE 30 GRAM(S): 4.1 POWDER, FOR SUSPENSION ORAL at 11:09

## 2021-07-26 RX ADMIN — Medication 50 GRAM(S): at 15:26

## 2021-07-26 RX ADMIN — Medication 1 APPLICATION(S): at 09:44

## 2021-07-26 RX ADMIN — Medication 40 MILLIGRAM(S): at 23:02

## 2021-07-26 RX ADMIN — Medication 1 APPLICATION(S): at 23:04

## 2021-07-26 RX ADMIN — BUDESONIDE AND FORMOTEROL FUMARATE DIHYDRATE 2 PUFF(S): 160; 4.5 AEROSOL RESPIRATORY (INHALATION) at 20:19

## 2021-07-26 RX ADMIN — HEPARIN SODIUM 5000 UNIT(S): 5000 INJECTION INTRAVENOUS; SUBCUTANEOUS at 13:47

## 2021-07-26 RX ADMIN — NYSTATIN CREAM 1 APPLICATION(S): 100000 CREAM TOPICAL at 23:04

## 2021-07-26 RX ADMIN — Medication 25 GRAM(S): at 05:07

## 2021-07-26 RX ADMIN — BUDESONIDE AND FORMOTEROL FUMARATE DIHYDRATE 2 PUFF(S): 160; 4.5 AEROSOL RESPIRATORY (INHALATION) at 11:30

## 2021-07-26 RX ADMIN — CEFTRIAXONE 1000 MILLIGRAM(S): 500 INJECTION, POWDER, FOR SOLUTION INTRAMUSCULAR; INTRAVENOUS at 23:04

## 2021-07-26 RX ADMIN — Medication 50 MILLIEQUIVALENT(S): at 01:29

## 2021-07-26 RX ADMIN — NYSTATIN CREAM 1 APPLICATION(S): 100000 CREAM TOPICAL at 09:44

## 2021-07-26 NOTE — CONSULT NOTE ADULT - SUBJECTIVE AND OBJECTIVE BOX
Pt is a 57 YO F that was admitted due to hypoglycemia and weakness. Pt is a diabetic and has been following up with her endocrinologist via telecommunication since covid. Pt has not ambulated significantly since COVID either. Pt has had no self care for several months.  Vascular surgery consulted to evaluate bilateral lower extremities due to venous stasis changes    ICU Vital Signs Last 24 Hrs  T(C): 36 (27 Jul 2021 00:00), Max: 37.2 (26 Jul 2021 10:00)  T(F): 96.8 (27 Jul 2021 00:00), Max: 98.9 (26 Jul 2021 10:00)  HR: 84 (27 Jul 2021 01:00) (73 - 108)  BP: 124/54 (27 Jul 2021 01:00) (93/40 - 140/87)  BP(mean): 69 (27 Jul 2021 01:00) (55 - 97)  ABP: --  ABP(mean): --  RR: 11 (27 Jul 2021 01:00) (11 - 28)  SpO2: 99% (27 Jul 2021 01:00) (94% - 100%)      PE  Physical Exam  General: NAD, fatigued  Chest: Equal expansion bilaterally  CV: S1S2 Present  Abdomen: Soft, distended, non-tender to palpation  Extremities: Grossly symmetric  Derm:  Red-blue discoloration noted to the LLE encompassing the   Vascular: warm to touch. Hypertrophic callus present. Dorsalis Pedis on left dopplerable and Posterior Tibial pulse on right dopperable. legs are malordorous. B/l legs with mild cellulites with no open wounds    Labs              x                    136  | 19<L>| 65<H>        x     >-----------< x       ------------------------< 251<H>                 x                    5.4<H>| 111<H>| 2.61<H>                                                                     Ca 7.2<L> Mg x     Ph x        ,             x                    138  | 26   | 61<H>        x     >-----------< x       ------------------------< 598<HH>                 x                    4.9  | 104  | 2.70<H>                                                                     Ca 6.5<LL> Mg x     Ph x          US Duplex Arterial Lower Ext Compl, Bilateral (07.26.21 @ 14:04) >  FINDINGS: Both anterior tibial arteries and the left peroneal artery were not visualized. Heart is parvus waveforms in the left dorsalis pedis artery. The common femoral, femoral, popliteal, peroneal, anterior and posterior tibial, and dorsalis pedis arteries are patent and demonstrate normal color-flow and biphasic waveforms. There is no evidence for stenosis or thrombosis.    IMPRESSION: Both anterior tibial arteries are not visualized. Tardus parvus waveforms in the left and dorsalis pedis artery, possibly hemodynamically significant stenosis or occlusion in the anterior tibial arteries.      < end of copied text >

## 2021-07-26 NOTE — DIETITIAN INITIAL EVALUATION ADULT. - PHYSCIAL ASSESSMENT
obese vanessa score of 11; stage 2 PU on Rt buttocks; Lt/Rt leg cellulitis and scoffing of leg  no BM documented

## 2021-07-26 NOTE — PROVIDER CONTACT NOTE (OTHER) - ACTION/TREATMENT ORDERED:
Sent email to Berenice
Called answering service, spoke with Yared
Called office. Dr. Ramos is made aware of consult.

## 2021-07-26 NOTE — DIETITIAN INITIAL EVALUATION ADULT. - ORAL INTAKE PTA/DIET HISTORY
pt reports 3 meals/day; protein with lunch and dinner.  good appetite noted.  encouraged protein intake for wound healing, pt verbalized understanding. given good appetite, encourage protein rich foods instead of supplement at this time.  no food allergies.  no vitamins.  pt missing teeth, no issues chewing any texture of food.

## 2021-07-26 NOTE — DIETITIAN INITIAL EVALUATION ADULT. - OTHER INFO
57yo female with PMH significant for DM p/w weakness and lethargy.  found to be hypoglycemic with diarrhea x 3 days.  Pt admitted with acute renal failure, hypoglycemia, cellulitis, and hyperkalemia.  pending nephrology consult.    *pt noted with severe venous stasis dermatitis.

## 2021-07-26 NOTE — CONSULT NOTE ADULT - SUBJECTIVE AND OBJECTIVE BOX
Patient is a 56y old  Female who presents with a chief complaint of acute renal failure (2021 11:59)    HPI:  57 y/o female, poor historian, with pmhx of DM on glipizide presents to ER c/o weakness and lethargy. Found to be hypoglycemic to 50 in field, received amp of D50 and improved to 84 by arrival to ER. States she has been having diarrhea for last few days. Denies any changes in urination, abdominal pain, shortness of breath, chest pain, headache, fevers, cough.    States she took normal dose of glipizide this morning and has not had decreased PO intake. Denies any substance abuse or etoh abuse. Very poorly kept with severe venous stasis dermatitis. was on chronic keflex and states it was helping but she stopped taking it.     Found to be in acute renal failure with ph of 7.09, Cr 2.9, K 6.5, serum CO2 of 12. received calcium, albuterol, and bicarb in ER. (2021 22:52)      PMH: as above  PSH: as above  Meds: per reconciliation sheet, noted below  MEDICATIONS  (STANDING):  ammonium lactate 12% Lotion 1 Application(s) Topical two times a day  budesonide  80 MICROgram(s)/formoterol 4.5 MICROgram(s) Inhaler 2 Puff(s) Inhalation two times a day  cefTRIAXone Injectable. 1000 milliGRAM(s) IV Push <User Schedule>  chlorhexidine 2% Cloths 1 Application(s) Topical <User Schedule>  dextrose 40% Gel 15 Gram(s) Oral once  dextrose 5%. 1000 milliLiter(s) (50 mL/Hr) IV Continuous <Continuous>  dextrose 5%. 1000 milliLiter(s) (100 mL/Hr) IV Continuous <Continuous>  dextrose 50% Injectable 25 Gram(s) IV Push once  dextrose 50% Injectable 12.5 Gram(s) IV Push once  dextrose 50% Injectable 25 Gram(s) IV Push once  glucagon  Injectable 1 milliGRAM(s) IntraMuscular once  heparin   Injectable 5000 Unit(s) SubCutaneous every 8 hours  insulin lispro (ADMELOG) corrective regimen sliding scale   SubCutaneous Before meals and at bedtime  methylPREDNISolone sodium succinate Injectable 40 milliGRAM(s) IV Push every 8 hours  nystatin Powder 1 Application(s) Topical two times a day  sodium bicarbonate  Infusion 0.165 mEq/kG/Hr (100 mL/Hr) IV Continuous <Continuous>    MEDICATIONS  (PRN):  ALBUTerol    90 MICROgram(s) HFA Inhaler 2 Puff(s) Inhalation every 6 hours PRN Shortness of Breath and/or Wheezing    Allergies    ampicillin (Unknown)    Intolerances      Social: no smoking, no alcohol, no illegal drugs; no recent travel, no exposure to TB  FAMILY HISTORY:     no history of premature cardiovascular disease in first degree relatives    ROS: the patient denies fever, no chills, no HA, no dizziness, no sore throat, no blurry vision, no CP, no palpitations, no SOB, no cough, no abdominal pain, no diarrhea, no N/V, no dysuria, no leg pain, no claudication, no rash, no joint aches, no rectal pain or bleeding, no night sweats  All other systems reviewed and are negative    Vital Signs Last 24 Hrs  T(C): 37.2 (2021 10:00), Max: 37.2 (2021 10:00)  T(F): 98.9 (2021 10:00), Max: 98.9 (2021 10:00)  HR: 94 (2021 12:00) (63 - 99)  BP: 112/65 (2021 12:00) (97/79 - 140/87)  BP(mean): 77 (2021 12:00) (65 - 101)  RR: 16 (2021 12:00) (13 - 28)  SpO2: 100% (2021 12:00) (94% - 100%)  Daily Height in cm: 157.48 (2021 20:40)    Daily Weight in k.8 (2021 00:00)    PE:    Constitutional: frail looking  HEENT: NC/AT, EOMI, PERRLA, conjunctivae clear; ears and nose atraumatic; pharynx benign  Neck: supple; thyroid not palpable  Back: no tenderness  Respiratory: respiratory effort normal; clear to auscultation  Cardiovascular: S1S2 regular, no murmurs  Abdomen: soft, not tender, not distended, positive BS; liver and spleen WNL  Genitourinary: no suprapubic tenderness  Lymphatic: no LN palpable  Musculoskeletal: no muscle tenderness, no joint swelling or tenderness  Extremities: no pedal edema  Neurological/ Psychiatric: AxOx3, Judgement and insight normal;  moving all extremities  Skin: no rashes; no palpable lesions    Labs: all available labs reviewed                        8.5    9.92  )-----------( 186      ( 2021 21:05 )             29.1         135  |  113<H>  |  63<H>  ----------------------------<  161<H>  6.3<HH>   |  16<L>  |  2.62<H>    Ca    7.1<L>      2021 08:41  Phos  5.5       Mg     2.0         TPro  8.6<H>  /  Alb  2.6<L>  /  TBili  0.4  /  DBili  x   /  AST  43<H>  /  ALT  22  /  AlkPhos  314<H>       LIVER FUNCTIONS - ( 2021 21:05 )  Alb: 2.6 g/dL / Pro: 8.6 gm/dL / ALK PHOS: 314 U/L / ALT: 22 U/L / AST: 43 U/L / GGT: x           Urinalysis Basic - ( 2021 22:31 )    Color: Yellow / Appearance: Slightly Turbid / S.020 / pH: x  Gluc: x / Ketone: Negative  / Bili: Negative / Urobili: Negative mg/dL   Blood: x / Protein: 30 mg/dL / Nitrite: Negative   Leuk Esterase: Negative / RBC: 0-2 /HPF / WBC 0-2   Sq Epi: x / Non Sq Epi: Few / Bacteria: Moderate          Radiology: all available radiological tests reviewed  < from: CT Abdomen and Pelvis No Cont (21 @ 23:52) >  EXAM:  CT ABDOMEN AND PELVIS                            PROCEDURE DATE:  2021          INTERPRETATION:  CLINICAL INFORMATION: Sepsis    COMPARISON: MR pelvis 10/14/2019.    CONTRAST/COMPLICATIONS:  IV Contrast: NONE  Oral Contrast: NONE  Complications: None reported at time of study completion    PROCEDURE:  CT of the Abdomen and Pelvis was performed.  Sagittal and coronal reformats were performed.    FINDINGS:  LOWER CHEST: Mild cardiomegaly. Trace pericardial effusion.    LIVER: Nodularhepatic contour suggestive of cirrhosis..  BILE DUCTS: Normal caliber.  GALLBLADDER: Within normal limits.  SPLEEN: 17.3 cm spleen splenomegaly  PANCREAS: Within normal limits.  ADRENALS: Within normal limits.  KIDNEYS/URETERS: Within normal limits.    BLADDER: Within normal limits.  REPRODUCTIVE ORGANS: 4.5 cm left adnexal mass with calcification not well evaluated on this noncontrast exam but grossly unchanged in size when compared to the MRI from 2019.    BOWEL: No bowel obstruction. Appendix is normal.  PERITONEUM: No ascites.  VESSELS: Atherosclerotic changes.  RETROPERITONEUM/LYMPH NODES: Mildly prominent bilateral pelvic obturator and external iliac lymph nodes are unchanged.  ABDOMINAL WALL: 4.5 x 1.8 cm right gluteal intramuscular lipoma, unchanged mild subcutaneous edema in the visualized lower abdomen and pelvis.  BONES: Within normal limits.    IMPRESSION:    Cardiomegaly and trace pericardial fluid.    Cirrhosis and splenomegaly.    Stable left adnexal mass.    Mildly prominent bilateral pelvic obturator and external iliac lymph nodes are unchanged.      Advanced directives addressed: full resuscitation Patient is a 56y old  Female who presents with a chief complaint of acute renal failure (2021 11:59)    HPI:  55 y/o female, poor historian, with pmhx of DM on glipizide presents to ER c/o weakness and lethargy. Found to be hypoglycemic to 50 in field, received amp of D50 and improved to 84 by arrival to ER. States she has been having diarrhea for last few days. Denies any changes in urination, abdominal pain, shortness of breath, chest pain, headache, fevers, cough.States she took normal dose of glipizide this morning and has not had decreased PO intake. Very poorly kept with severe venous stasis dermatitis. was on chronic keflex and states it was helping but she stopped taking it. Found to be in acute renal failure with ph of 7.09, Cr 2.9, K 6.5, serum CO2 of 12. received calcium, albuterol, and bicarb in ER. Has chronic LE stasis dermatitis weeping wounds with foul smell, concern for cellulitis, given vanco/rocephin.      PMH: as above  PSH: as above  Meds: per reconciliation sheet, noted below  MEDICATIONS  (STANDING):  ammonium lactate 12% Lotion 1 Application(s) Topical two times a day  budesonide  80 MICROgram(s)/formoterol 4.5 MICROgram(s) Inhaler 2 Puff(s) Inhalation two times a day  cefTRIAXone Injectable. 1000 milliGRAM(s) IV Push <User Schedule>  chlorhexidine 2% Cloths 1 Application(s) Topical <User Schedule>  dextrose 40% Gel 15 Gram(s) Oral once  dextrose 5%. 1000 milliLiter(s) (50 mL/Hr) IV Continuous <Continuous>  dextrose 5%. 1000 milliLiter(s) (100 mL/Hr) IV Continuous <Continuous>  dextrose 50% Injectable 25 Gram(s) IV Push once  dextrose 50% Injectable 12.5 Gram(s) IV Push once  dextrose 50% Injectable 25 Gram(s) IV Push once  glucagon  Injectable 1 milliGRAM(s) IntraMuscular once  heparin   Injectable 5000 Unit(s) SubCutaneous every 8 hours  insulin lispro (ADMELOG) corrective regimen sliding scale   SubCutaneous Before meals and at bedtime  methylPREDNISolone sodium succinate Injectable 40 milliGRAM(s) IV Push every 8 hours  nystatin Powder 1 Application(s) Topical two times a day  sodium bicarbonate  Infusion 0.165 mEq/kG/Hr (100 mL/Hr) IV Continuous <Continuous>    MEDICATIONS  (PRN):  ALBUTerol    90 MICROgram(s) HFA Inhaler 2 Puff(s) Inhalation every 6 hours PRN Shortness of Breath and/or Wheezing    Allergies    ampicillin (Unknown)    Intolerances      Social: no smoking, no alcohol, no illegal drugs; no recent travel, no exposure to TB  FAMILY HISTORY:     no history of premature cardiovascular disease in first degree relatives    ROS: the patient denies fever, no chills, no HA, no dizziness, no sore throat, no blurry vision, no CP, no palpitations, no SOB, no cough, no abdominal pain, no diarrhea, no N/V, no dysuria, no leg pain, no claudication, no rash, no joint aches, no rectal pain or bleeding, no night sweats  All other systems reviewed and are negative    Vital Signs Last 24 Hrs  T(C): 37.2 (2021 10:00), Max: 37.2 (2021 10:00)  T(F): 98.9 (2021 10:00), Max: 98.9 (2021 10:00)  HR: 94 (2021 12:00) (63 - 99)  BP: 112/65 (2021 12:00) (97/79 - 140/87)  BP(mean): 77 (2021 12:00) (65 - 101)  RR: 16 (2021 12:00) (13 - 28)  SpO2: 100% (2021 12:00) (94% - 100%)  Daily Height in cm: 157.48 (2021 20:40)    Daily Weight in k.8 (2021 00:00)    PE:    Constitutional: frail looking  HEENT: NC/AT, EOMI, PERRLA, conjunctivae clear; ears and nose atraumatic; pharynx benign  Neck: supple; thyroid not palpable  Back: no tenderness  Respiratory: respiratory effort normal; clear to auscultation  Cardiovascular: S1S2 regular, no murmurs  Abdomen: soft, not tender, not distended, positive BS; liver and spleen WNL  Genitourinary: no suprapubic tenderness  Lymphatic: no LN palpable  Musculoskeletal: no muscle tenderness, no joint swelling or tenderness  Extremities: b/l LE skin thickening, weeping wounds, foul smell warmth  Neurological/ Psychiatric: AxOx3, Judgement and insight normal;  moving all extremities  Skin: no rashes; no palpable lesions    Labs: all available labs reviewed                        8.5    9.92  )-----------( 186      ( 2021 21:05 )             29.1         135  |  113<H>  |  63<H>  ----------------------------<  161<H>  6.3<HH>   |  16<L>  |  2.62<H>    Ca    7.1<L>      2021 08:41  Phos  5.5       Mg     2.0         TPro  8.6<H>  /  Alb  2.6<L>  /  TBili  0.4  /  DBili  x   /  AST  43<H>  /  ALT  22  /  AlkPhos  314<H>       LIVER FUNCTIONS - ( 2021 21:05 )  Alb: 2.6 g/dL / Pro: 8.6 gm/dL / ALK PHOS: 314 U/L / ALT: 22 U/L / AST: 43 U/L / GGT: x           Urinalysis Basic - ( 2021 22:31 )    Color: Yellow / Appearance: Slightly Turbid / S.020 / pH: x  Gluc: x / Ketone: Negative  / Bili: Negative / Urobili: Negative mg/dL   Blood: x / Protein: 30 mg/dL / Nitrite: Negative   Leuk Esterase: Negative / RBC: 0-2 /HPF / WBC 0-2   Sq Epi: x / Non Sq Epi: Few / Bacteria: Moderate          Radiology: all available radiological tests reviewed  < from: CT Abdomen and Pelvis No Cont (21 @ 23:52) >  EXAM:  CT ABDOMEN AND PELVIS                            PROCEDURE DATE:  2021          INTERPRETATION:  CLINICAL INFORMATION: Sepsis    COMPARISON: MR pelvis 10/14/2019.    CONTRAST/COMPLICATIONS:  IV Contrast: NONE  Oral Contrast: NONE  Complications: None reported at time of study completion    PROCEDURE:  CT of the Abdomen and Pelvis was performed.  Sagittal and coronal reformats were performed.    FINDINGS:  LOWER CHEST: Mild cardiomegaly. Trace pericardial effusion.    LIVER: Nodularhepatic contour suggestive of cirrhosis..  BILE DUCTS: Normal caliber.  GALLBLADDER: Within normal limits.  SPLEEN: 17.3 cm spleen splenomegaly  PANCREAS: Within normal limits.  ADRENALS: Within normal limits.  KIDNEYS/URETERS: Within normal limits.    BLADDER: Within normal limits.  REPRODUCTIVE ORGANS: 4.5 cm left adnexal mass with calcification not well evaluated on this noncontrast exam but grossly unchanged in size when compared to the MRI from 2019.    BOWEL: No bowel obstruction. Appendix is normal.  PERITONEUM: No ascites.  VESSELS: Atherosclerotic changes.  RETROPERITONEUM/LYMPH NODES: Mildly prominent bilateral pelvic obturator and external iliac lymph nodes are unchanged.  ABDOMINAL WALL: 4.5 x 1.8 cm right gluteal intramuscular lipoma, unchanged mild subcutaneous edema in the visualized lower abdomen and pelvis.  BONES: Within normal limits.    IMPRESSION:    Cardiomegaly and trace pericardial fluid.    Cirrhosis and splenomegaly.    Stable left adnexal mass.    Mildly prominent bilateral pelvic obturator and external iliac lymph nodes are unchanged.      Advanced directives addressed: full resuscitation

## 2021-07-26 NOTE — CONSULT NOTE ADULT - ASSESSMENT
Assessment: 55 y/o F pt has been seen by podiatry for the evaluation of;  - B/L Lower extremity cellulitis  - B/L Lower extremity PAD  - B/L Lower extremity venous stasis dermatitis, complicated with venous stasis ulcerations   - Onychomycosis to toenails x 10  - Diabetic neuropathy  - Pain to both LEs and inability to ambulate     Plan:  - Pt is evaluated and chart reviewed.  - Reviewed the imaging and discussed the case with Dr. Sheffield  - Discussed the potential causes and plan of management with the Pt.  - Discussed all the different aspects of treatment with the Pt  - Educated Pt about the patient about the proper foot hygiene  - Educated the Pt about the necessity to maintain tight glycemic control, to avoid the development of any further complications.   - Pt demonstrated verbal understanding, and agreed to the plan of treatment   - Aseptic mechanical debridement of the onychomycotic toenails x 10 with sterile nippers, pt tolerated the procedure well without any complications.   - To practice elevation of both LEs on two pillows.  - Recommended vascular surgery consult.  - Local wound care to both LEs, per vascular surgery debartment recommendations,  - Ordered Venous and Arterial duplex studies to both LEs  - Care per ICU team, appreciated.  - As there is no current acute condition to the foot/ankle, there is no surgical intervention warranted at this time,   - Thank you for the courtesy of this consult. Podiatry will sign off, please re-consult as needed.

## 2021-07-26 NOTE — PROGRESS NOTE ADULT - ASSESSMENT
A/P: 56 female who presents with LE cellulitis and sepsis from it, NEIL with hyperkalemia and acidosis  DM II  HTN      Plan:   ICU    PULM: Continue Solumedrol for bronchospasm and will decrease Bicarb drip    Cardio: Hemodynamics are reasonable.      Renal: NEIL likely from ATN, Kayexalate 30 GM given, repeat BMP at 2PM, Continue Bicarb drip, Renal seeing the patient.  Hopefully HD can be avoided    GI: ADA diet    ENDO: RISS    ID: Consult PND, Continue Rocephin for cellulitis

## 2021-07-26 NOTE — CONSULT NOTE ADULT - ASSESSMENT
55 y/o female, poor historian, with pmhx of DM on glipizide presents to ER c/o weakness and lethargy. Found to be hypoglycemic to 50 in field, received amp of D50 and improved to 84 by arrival to ER. States she has been having diarrhea for last few days. Denies any changes in urination, abdominal pain, shortness of breath, chest pain, headache, fevers, cough.States she took normal dose of glipizide this morning and has not had decreased PO intake. Very poorly kept with severe venous stasis dermatitis. was on chronic keflex and states it was helping but she stopped taking it. Found to be in acute renal failure with ph of 7.09, Cr 2.9, K 6.5, serum CO2 of 12. received calcium, albuterol, and bicarb in ER. Has chronic LE stasis dermatitis weeping wounds with foul smell, concern for cellulitis, given vanco/rocephin.    1. b/l LE chronic stasis dermatitis. venous stasis. superimposed cellulitis. NEIL  - agree with vancomycin renally dose adjusted 1gm - check level tomorrow and redose  - on rocephin 1gm daily   - f/u cultures  - monitor temps  - wound care, whirpool tx if able  - monitor temps  - tolerating abx well so far; no side effects noted  - reason for abx use and side effects reviewed with patient  - supportive care  - fu cbc    2. other issues - care per medicine  55 y/o female, poor historian, with pmhx of DM on glipizide presents to ER c/o weakness and lethargy. Found to be hypoglycemic to 50 in field, received amp of D50 and improved to 84 by arrival to ER. States she has been having diarrhea for last few days. Denies any changes in urination, abdominal pain, shortness of breath, chest pain, headache, fevers, cough.States she took normal dose of glipizide this morning and has not had decreased PO intake. Very poorly kept with severe venous stasis dermatitis. was on chronic keflex and states it was helping but she stopped taking it. Found to be in acute renal failure with ph of 7.09, Cr 2.9, K 6.5, serum CO2 of 12. received calcium, albuterol, and bicarb in ER. Has chronic LE stasis dermatitis weeping wounds with foul smell, concern for cellulitis, given vanco/rocephin.    1. b/l LE chronic stasis dermatitis. venous stasis. superimposed cellulitis. NEIL  - s/p vancomycin x 1 check level 9 pm and will re-dose  - on rocephin 1gm daily   - continue with abx coverage  - f/u cultures  - monitor temps  - wound care, whirpool tx if able  - monitor temps  - tolerating abx well so far; no side effects noted  - reason for abx use and side effects reviewed with patient  - supportive care  - fu cbc    2. other issues - care per medicine

## 2021-07-26 NOTE — PROGRESS NOTE ADULT - ASSESSMENT
A:    56F  HD # 2  FULL CODE    Here for:    1. NEIL complicated by  2. Hyperkalemia  3. Severe sepsis 2/2  4. Cellulitis B/L LE  5. Hypoglycemia  6. Hyperglycemia    P:    Most concerning problem at this time is continued hyperK+ in setting of NEIL    Avoid deleriogenic meds.   HD monitoring.  IS, OOB as able.  Continue renal diet.  Broad spectrum abx for SSTI CTX 1g IV qd, ID involved. Afebrile.  VTE ppx SQH, PUD ppx protonix.  Continue Na+HCO3 for hyperkalemia, isotonic @  100cc/hr. Kayexelate 30g PO x 1 dose now for K+ 5.4.  Increase ISS, goal BG < 180. Higher doses insulin will also help shift K+ intracellular.  f/u AM labs, replete lytes PRN.    Dispo: Cont care.

## 2021-07-26 NOTE — CONSULT NOTE ADULT - SUBJECTIVE AND OBJECTIVE BOX
Date of service: 2021  CC: B/L leg ulcerative lesions   HPI: 55 y/o female, poor historian, with PMH of DM on glipizide, that been admitted to  since  for the management of hypoglycemia and electrolyte imbalance. Pt states that she has ulcerative lesions to both LEs for the last couple of months, complicating redness to the legs that has been running for more than a year. Pt reports that she used to have a routine care with podiatrist Dr. Sheffield before the pandemic but stopped since COVID starts. Pt denies any F/N/V at this time, but admits having SOB, and needs supplementary O2.    REVIEW OF SYSTEMS:  CONSTITUTIONAL: No fever, chills,  weight loss, or fatigue  EYES: No eye pain, visual disturbances, or discharge  ENMT:  No difficulty hearing, tinnitus, vertigo; No sinus or throat pain  NECK: No pain or stiffness  RESPIRATORY: No cough, wheezing, or hemoptysis; + shortness of breath  CARDIOVASCULAR: No chest pain, palpitations, dizziness, or leg swelling  GASTROINTESTINAL: No abdominal or epigastric pain. No nausea, vomiting, or hematemesis; No diarrhea or constipation. No melena or hematochezia.  GENITOURINARY: No dysuria, frequency, hematuria, or incontinence  NEUROLOGICAL: No headaches, memory loss, loss of strength, numbness, or tremors  SKIN: + itchiness to both legs, open superficial oozing wounds to both legs.   LYMPH NODES: No enlarged glands  ENDOCRINE: No heat or cold intolerance; No hair loss  MUSCULOSKELETAL: No joint pain or swelling; No muscle, back, or extremity pain  HEME/LYMPH: No easy bruising, or bleeding gums    PAST MEDICAL & SURGICAL HISTORY:  Diabetes mellitus  No significant past surgical history    Allergies  ampicillin (Unknown)    MEDICATIONS  (STANDING):  ammonium lactate 12% Lotion 1 Application(s) Topical two times a day  budesonide  80 MICROgram(s)/formoterol 4.5 MICROgram(s) Inhaler 2 Puff(s) Inhalation two times a day  cefTRIAXone Injectable. 1000 milliGRAM(s) IV Push <User Schedule>  chlorhexidine 2% Cloths 1 Application(s) Topical <User Schedule>  dextrose 40% Gel 15 Gram(s) Oral once  dextrose 50% Injectable 25 Gram(s) IV Push once  dextrose 50% Injectable 12.5 Gram(s) IV Push once  dextrose 50% Injectable 25 Gram(s) IV Push once  glucagon  Injectable 1 milliGRAM(s) IntraMuscular once  heparin   Injectable 5000 Unit(s) SubCutaneous every 8 hours  methylPREDNISolone sodium succinate Injectable 40 milliGRAM(s) IV Push every 8 hours  nystatin Powder 1 Application(s) Topical two times a day  sodium bicarbonate  Infusion 0.165 mEq/kG/Hr IV Continuous <Continuous>  sodium polystyrene sulfonate Suspension 30 Gram(s) Oral once    MEDICATIONS  (PRN):  ALBUTerol    90 MICROgram(s) HFA Inhaler 2 Puff(s) Inhalation every 6 hours PRN Shortness of Breath and/or Wheezing    VITALS:  Vital Signs Last 24 Hrs  T(C): 37.2 (21 @ 10:00), Max: 37.2 (21 @ 10:00)  T(F): 98.9 (21 @ 10:00), Max: 98.9 (21 @ 10:00)  HR: 82 (21 @ 09:53) (63 - 98)  BP: 140/87 (21 @ 09:00) (97/79 - 140/87)  BP(mean): 95 (21 @ 09:00) (65 - 101)  RR: 28 (21 @ 09:00) (13 - 28)  SpO2: 97% (21 @ 09:00) (96% - 100%)    Physical Examination:  Constitutional: AAOx3, non-focal; on supplementary O2 via nasal cannula   Focused LE exam:  Vascular: Temperature gradient is warm to warm b/l, Non palpable pulses to DP/PT  b/l, capillary re-fill time is delayed to digits 1-5 b/l. +2 pitting edema to the leg b/l extending to the tibial tuberosity.  Neuro: Diminished protective sensation to the foot and digits 1-5 b/l.  Derm: Noted thickened skin, with erythema, and swelling that extends from the ankle up to the proximal leg B/L, with multiple superficial ulcerative lesions to both legs, oozing serous fluid, with the largest one is involving the crater area in circumferential pattern around the Lt ankle No purulent drainage, - tunneling, - probing to bone, and - undermining.   Noted elongated, thickened and dystrophic toenails, to all the digits 1-5 B/L, with subungual debris.  Musculoskeletal: Unable to assess because of the patient's level of pain,     LABS:             8.5    9.92  )-----------( 186      ( 2021 21:05 )             29.1       135  |  113<H>  |  63<H>  ----------------------------<  161<H>  6.3<HH>   |  16<L>  |  2.62<H>    Ca    7.1<L>      2021 08:41  Phos  5.5       Mg     2.0         TPro  8.6<H>  /  Alb  2.6<L>  /  TBili  0.4  /  DBili  x   /  AST  43<H>  /  ALT  22  /  AlkPhos  314<H>      PT/INR - ( 2021 23:34 )   PT: 13.8 sec;   INR: 1.19 ratio    PTT - ( 2021 23:34 )  PTT:38.2 sec    Urinalysis Basic - ( 2021 22:31 )  Color: Yellow / Appearance: Slightly Turbid / S.020 / pH: x  Gluc: x / Ketone: Negative  / Bili: Negative / Urobili: Negative mg/dL   Blood: x / Protein: 30 mg/dL / Nitrite: Negative   Leuk Esterase: Negative / RBC: 0-2 /HPF / WBC 0-2   Sq Epi: x / Non Sq Epi: Few / Bacteria: Moderate

## 2021-07-26 NOTE — PROGRESS NOTE ADULT - SUBJECTIVE AND OBJECTIVE BOX
HPI:  55 y/o female, poor historian, with pmhx of DM on glipizide presents to ER c/o weakness and lethargy. Found to be hypoglycemic to 50 in field, received amp of D50 and improved to 84 by arrival to ER. States she has been having diarrhea for last few days.  States she took normal dose of glipizide and has not had decreased PO intake. Denies any substance abuse or etoh abuse. Very poorly kept with severe venous stasis dermatitis. Was on chronic keflex and states it was helping but she stopped taking it.  Found to be in acute renal failure with ph of 7.09, Cr 2.9, K 6.5, serum CO2 of 12. Received calcium, albuterol, and bicarb in ER.    : Patient is awake and alert, making urine, has received multiple rounds of the cocktail but not a binding resin and her K remains elevated.         PAST MEDICAL & SURGICAL HISTORY:  Diabetes mellitus    No significant past surgical history        FAMILY HISTORY:      Social Hx:    Allergies    ampicillin (Unknown)    Intolerances        Height (cm): 157.5 ( @ 20:40)  Weight (kg): 90.7 ( @ 20:40)  BMI (kg/m2): 36.6 ( @ 20:40)    ICU Vital Signs Last 24 Hrs  T(C): 37.2 (2021 10:00), Max: 37.2 (2021 10:00)  T(F): 98.9 (2021 10:00), Max: 98.9 (2021 10:00)  HR: 99 (2021 11:00) (63 - 99)  BP: 120/90 (2021 11:00) (97/79 - 140/87)  BP(mean): 96 (2021 11:00) (65 - 101)  ABP: --  ABP(mean): --  RR: 16 (2021 11:00) (13 - 28)  SpO2: 94% (2021 11:00) (94% - 100%)          I&O's Summary    2021 07:01  -  2021 07:00  --------------------------------------------------------  IN: 2674 mL / OUT: 330 mL / NET: 2344 mL    2021 07:01  -  2021 11:59  --------------------------------------------------------  IN: 0 mL / OUT: 50 mL / NET: -50 mL                              8.5    9.92  )-----------( 186      ( 2021 21:05 )             29.1           135  |  113<H>  |  63<H>  ----------------------------<  161<H>  6.3<HH>   |  16<L>  |  2.62<H>    Ca    7.1<L>      2021 08:41  Phos  5.5       Mg     2.0         TPro  8.6<H>  /  Alb  2.6<L>  /  TBili  0.4  /  DBili  x   /  AST  43<H>  /  ALT  22  /  AlkPhos  314<H>        CARDIAC MARKERS ( 2021 08:41 )  0.132 ng/mL / x     / x     / x     / x      CARDIAC MARKERS ( 2021 03:38 )  x     / x     / 77 U/L / x     / x      CARDIAC MARKERS ( 2021 23:34 )  0.062 ng/mL / x     / x     / x     / x                Urinalysis Basic - ( 2021 22:31 )    Color: Yellow / Appearance: Slightly Turbid / S.020 / pH: x  Gluc: x / Ketone: Negative  / Bili: Negative / Urobili: Negative mg/dL   Blood: x / Protein: 30 mg/dL / Nitrite: Negative   Leuk Esterase: Negative / RBC: 0-2 /HPF / WBC 0-2   Sq Epi: x / Non Sq Epi: Few / Bacteria: Moderate        MEDICATIONS  (STANDING):  ammonium lactate 12% Lotion 1 Application(s) Topical two times a day  budesonide  80 MICROgram(s)/formoterol 4.5 MICROgram(s) Inhaler 2 Puff(s) Inhalation two times a day  cefTRIAXone Injectable. 1000 milliGRAM(s) IV Push <User Schedule>  chlorhexidine 2% Cloths 1 Application(s) Topical <User Schedule>  dextrose 40% Gel 15 Gram(s) Oral once  dextrose 5%. 1000 milliLiter(s) (50 mL/Hr) IV Continuous <Continuous>  dextrose 5%. 1000 milliLiter(s) (100 mL/Hr) IV Continuous <Continuous>  dextrose 50% Injectable 25 Gram(s) IV Push once  dextrose 50% Injectable 12.5 Gram(s) IV Push once  dextrose 50% Injectable 25 Gram(s) IV Push once  glucagon  Injectable 1 milliGRAM(s) IntraMuscular once  heparin   Injectable 5000 Unit(s) SubCutaneous every 8 hours  insulin lispro (ADMELOG) corrective regimen sliding scale   SubCutaneous Before meals and at bedtime  methylPREDNISolone sodium succinate Injectable 40 milliGRAM(s) IV Push every 8 hours  nystatin Powder 1 Application(s) Topical two times a day  sodium bicarbonate  Infusion 0.165 mEq/kG/Hr (100 mL/Hr) IV Continuous <Continuous>    MEDICATIONS  (PRN):  ALBUTerol    90 MICROgram(s) HFA Inhaler 2 Puff(s) Inhalation every 6 hours PRN Shortness of Breath and/or Wheezing      DVT Prophylaxis:    Advanced Directives:  Discussed with:    Visit Information: 30 min    ** Time is exclusive of billed procedures and/or teaching and/or routine family updates.

## 2021-07-26 NOTE — CONSULT NOTE ADULT - ASSESSMENT
57 YO F with superficial leg infection    Rec local wound care  Glycemic control  Leg off loading  Abx as per primary team  No surgical intervention planned for this pt. Thank you for the consult. Vascular surgery will signoff    Case discussed with Dr Gregory Iyer

## 2021-07-26 NOTE — CONSULT NOTE ADULT - SUBJECTIVE AND OBJECTIVE BOX
NEPHROLOGY INTERVAL HPI/OVERNIGHT EVENTS:  TI [id number]  HPI:  55 y/o female, poor historian, with pmhx of DM on glipizide presents to ER c/o weakness and lethargy. Found to be hypoglycemic to 50 in field, received amp of D50 and improved to 84 by arrival to ER. States she has been having diarrhea for last few days. Denies any changes in urination, abdominal pain, shortness of breath, chest pain, headache, fevers, cough.    States she took normal dose of glipizide this morning and has not had decreased PO intake. Denies any substance abuse or etoh abuse. Very poorly kept with severe venous stasis dermatitis. was on chronic keflex and states it was helping but she stopped taking it.     Found to be in acute renal failure with ph of 7.09, Cr 2.9, K 6.5, serum CO2 of 12. received calcium, albuterol, and bicarb in ER. (2021 22:52)      Patient is a 56y old  Female who presents with a chief complaint of acute renal failure (2021 12:45)      PAST MEDICAL & SURGICAL HISTORY:  Diabetes mellitus    No significant past surgical history        FAMILY HISTORY:      MEDICATIONS  (STANDING):  ammonium lactate 12% Lotion 1 Application(s) Topical two times a day  budesonide  80 MICROgram(s)/formoterol 4.5 MICROgram(s) Inhaler 2 Puff(s) Inhalation two times a day  cefTRIAXone Injectable. 1000 milliGRAM(s) IV Push <User Schedule>  chlorhexidine 2% Cloths 1 Application(s) Topical <User Schedule>  dextrose 40% Gel 15 Gram(s) Oral once  dextrose 5%. 1000 milliLiter(s) (50 mL/Hr) IV Continuous <Continuous>  dextrose 5%. 1000 milliLiter(s) (100 mL/Hr) IV Continuous <Continuous>  dextrose 50% Injectable 25 Gram(s) IV Push once  dextrose 50% Injectable 12.5 Gram(s) IV Push once  dextrose 50% Injectable 25 Gram(s) IV Push once  glucagon  Injectable 1 milliGRAM(s) IntraMuscular once  heparin   Injectable 5000 Unit(s) SubCutaneous every 8 hours  insulin lispro (ADMELOG) corrective regimen sliding scale   SubCutaneous Before meals and at bedtime  methylPREDNISolone sodium succinate Injectable 40 milliGRAM(s) IV Push every 8 hours  nystatin Powder 1 Application(s) Topical two times a day  sodium bicarbonate  Infusion 0.165 mEq/kG/Hr (100 mL/Hr) IV Continuous <Continuous>  vancomycin  IVPB 1000 milliGRAM(s) IV Intermittent daily    MEDICATIONS  (PRN):  ALBUTerol    90 MICROgram(s) HFA Inhaler 2 Puff(s) Inhalation every 6 hours PRN Shortness of Breath and/or Wheezing      Allergies    ampicillin (Unknown)    Intolerances        I&O's Summary    2021 07:01  -  2021 07:00  --------------------------------------------------------  IN: 2674 mL / OUT: 330 mL / NET: 2344 mL    2021 07:01  -  2021 15:36  --------------------------------------------------------  IN: 1210 mL / OUT: 255 mL / NET: 955 mL        Home Medications:      Patient was seen and evaluated on dialysis.   Patient is tolerating the procedure well.   Continue dialysis:   Dialyzer:          QB:        QD:   Goal UF ___ over ___ Hours       Vital Signs Last 24 Hrs  T(C): 36.7 (2021 14:00), Max: 37.2 (2021 10:00)  T(F): 98 (2021 14:00), Max: 98.9 (2021 10:00)  HR: 88 (2021 15:00) (63 - 99)  BP: 93/40 (2021 15:00) (93/40 - 140/87)  BP(mean): 55 (2021 15:00) (55 - 101)  RR: 23 (2021 15:00) (13 - 28)  SpO2: 100% (2021 15:00) (94% - 100%)  Daily Height in cm: 157.48 (2021 20:40)    Daily Weight in k.8 (2021 00:00)  I&O's Summary    2021 07:01  -  2021 07:00  --------------------------------------------------------  IN: 2674 mL / OUT: 330 mL / NET: 2344 mL    2021 07:01  -  2021 15:36  --------------------------------------------------------  IN: 1210 mL / OUT: 255 mL / NET: 955 mL        PHYSICAL EXAM:  GEN: alert awake O X 3  HEENT: MMM  NECK supple no jvd  CV: RRR s1s2  LUNGS: b/l CTA  ABD: + soft,   EXT: no edema    LABS:                        8.5    9.92  )-----------( 186      ( 2021 21:05 )             29.1     07-26    138  |  111<H>  |  65<H>  ----------------------------<  309<H>  6.1<H>   |  18<L>  |  2.69<H>    Ca    6.9<L>      2021 13:47  Phos  5.5       Mg     2.0         TPro  8.6<H>  /  Alb  2.6<L>  /  TBili  0.4  /  DBili  x   /  AST  43<H>  /  ALT  22  /  AlkPhos  314<H>  07-25    PT/INR - ( 2021 23:34 )   PT: 13.8 sec;   INR: 1.19 ratio         PTT - ( 2021 23:34 )  PTT:38.2 sec  Urinalysis Basic - ( 2021 22:31 )    Color: Yellow / Appearance: Slightly Turbid / S.020 / pH: x  Gluc: x / Ketone: Negative  / Bili: Negative / Urobili: Negative mg/dL   Blood: x / Protein: 30 mg/dL / Nitrite: Negative   Leuk Esterase: Negative / RBC: 0-2 /HPF / WBC 0-2   Sq Epi: x / Non Sq Epi: Few / Bacteria: Moderate      Magnesium, Serum: 2.0 mg/dL ( @ 08:41)  Phosphorus Level, Serum: 5.5 mg/dL ( @ 08:41)  Magnesium, Serum: 1.9 mg/dL ( @ 03:38)  Phosphorus Level, Serum: 6.3 mg/dL ( @ 03:38)  Magnesium, Serum: 1.8 mg/dL ( @ 23:34)  Phosphorus Level, Serum: 6.9 mg/dL ( @ 23:34)       NEPHROLOGY INTERVAL HPI/OVERNIGHT EVENTS:  TI [id number]  HPI:  57 y/o female, poor historian, with pmhx of DM on glipizide presents to ER c/o weakness and lethargy. Found to be hypoglycemic to 50 in field, received amp of D50 and improved to 84 by arrival to ER. States she has been having diarrhea for last few days. Denies any changes in urination, abdominal pain, shortness of breath, chest pain, headache, fevers, cough.    States she took normal dose of glipizide this morning and has not had decreased PO intake. Denies any substance abuse or etoh abuse. Very poorly kept with severe venous stasis dermatitis. was on chronic keflex and states it was helping but she stopped taking it.     Found to be in acute renal failure with ph of 7.09, Cr 2.9, K 6.5, serum CO2 of 12. received calcium, albuterol, and bicarb in ER. (2021 22:52)  Patient is a 56y old  Female who presents with a chief complaint of acute renal failure (2021 12:45)  ---------------------------------------------  above hx obtained from pt and sister at bedside   has not seen physician and only done telehealth since pandemic  no labs obtained  has been having LE lesions with erythema and weeping since feb  has been on keflex since march   no n/v/d  no nsaid use   admits to mild ckd hx with metformin intake PTA and unable to get ct with ivc  has hx of mild chf with no cardiac eval and states she was recommended to get NST vs cardiac cath   has been fearful to leave home and has not gotten covid vaccine   denies ingestion of any etoh substances   home meds include lasix, kcl supplements and losartan and metformin     PAST MEDICAL & SURGICAL HISTORY:  - dm dx 3 yo  - ckd   - htn   - hypothy  - mild chf no cardiac fu          FAMILY HISTORY:      MEDICATIONS  (STANDING):  ammonium lactate 12% Lotion 1 Application(s) Topical two times a day  budesonide  80 MICROgram(s)/formoterol 4.5 MICROgram(s) Inhaler 2 Puff(s) Inhalation two times a day  cefTRIAXone Injectable. 1000 milliGRAM(s) IV Push <User Schedule>  chlorhexidine 2% Cloths 1 Application(s) Topical <User Schedule>  dextrose 40% Gel 15 Gram(s) Oral once  dextrose 5%. 1000 milliLiter(s) (50 mL/Hr) IV Continuous <Continuous>  dextrose 5%. 1000 milliLiter(s) (100 mL/Hr) IV Continuous <Continuous>  dextrose 50% Injectable 25 Gram(s) IV Push once  dextrose 50% Injectable 12.5 Gram(s) IV Push once  dextrose 50% Injectable 25 Gram(s) IV Push once  glucagon  Injectable 1 milliGRAM(s) IntraMuscular once  heparin   Injectable 5000 Unit(s) SubCutaneous every 8 hours  insulin lispro (ADMELOG) corrective regimen sliding scale   SubCutaneous Before meals and at bedtime  methylPREDNISolone sodium succinate Injectable 40 milliGRAM(s) IV Push every 8 hours  nystatin Powder 1 Application(s) Topical two times a day  sodium bicarbonate  Infusion 0.165 mEq/kG/Hr (100 mL/Hr) IV Continuous <Continuous>  vancomycin  IVPB 1000 milliGRAM(s) IV Intermittent daily    MEDICATIONS  (PRN):  ALBUTerol    90 MICROgram(s) HFA Inhaler 2 Puff(s) Inhalation every 6 hours PRN Shortness of Breath and/or Wheezing      Allergies    ampicillin (Unknown)    Intolerances        I&O's Summary    2021 07:  -  2021 07:00  --------------------------------------------------------  IN: 2674 mL / OUT: 330 mL / NET: 2344 mL    2021 07:01  -  2021 15:36  --------------------------------------------------------  IN: 1210 mL / OUT: 255 mL / NET: 955 mL        Home Medications:          Vital Signs Last 24 Hrs  T(C): 36.7 (2021 14:00), Max: 37.2 (2021 10:00)  T(F): 98 (2021 14:00), Max: 98.9 (2021 10:00)  HR: 88 (2021 15:00) (63 - 99)  BP: 93/40 (2021 15:00) (93/40 - 140/87)  BP(mean): 55 (2021 15:00) (55 - 101)  RR: 23 (2021 15:00) (13 - 28)  SpO2: 100% (2021 15:00) (94% - 100%)  Daily Height in cm: 157.48 (2021 20:40)    Daily Weight in k.8 (2021 00:00)  I&O's Summary    2021 07:01  -  2021 07:00  --------------------------------------------------------  IN: 2674 mL / OUT: 330 mL / NET: 2344 mL    2021 07:01  -  2021 15:36  --------------------------------------------------------  IN: 1210 mL / OUT: 255 mL / NET: 955 mL        PHYSICAL EXAM:  GEN: alert awake O X 3  HEENT: MMM  NECK supple no jvd  CV: RRR s1s2  LUNGS: b/l CTA  ABD: + soft,   EXT: + edema chronic venous stasis, erythema     LABS:                        8.5    9.92  )-----------( 186      ( 2021 21:05 )             29.1     07-26    138  |  111<H>  |  65<H>  ----------------------------<  309<H>  6.1<H>   |  18<L>  |  2.69<H>    Ca    6.9<L>      2021 13:47  Phos  5.5       Mg     2.0         TPro  8.6<H>  /  Alb  2.6<L>  /  TBili  0.4  /  DBili  x   /  AST  43<H>  /  ALT  22  /  AlkPhos  314<H>      PT/INR - ( 2021 23:34 )   PT: 13.8 sec;   INR: 1.19 ratio         PTT - ( 2021 23:34 )  PTT:38.2 sec  Urinalysis Basic - ( 2021 22:31 )    Color: Yellow / Appearance: Slightly Turbid / S.020 / pH: x  Gluc: x / Ketone: Negative  / Bili: Negative / Urobili: Negative mg/dL   Blood: x / Protein: 30 mg/dL / Nitrite: Negative   Leuk Esterase: Negative / RBC: 0-2 /HPF / WBC 0-2   Sq Epi: x / Non Sq Epi: Few / Bacteria: Moderate      Magnesium, Serum: 2.0 mg/dL ( @ 08:41)  Phosphorus Level, Serum: 5.5 mg/dL ( @ 08:41)  Magnesium, Serum: 1.9 mg/dL ( @ 03:38)  Phosphorus Level, Serum: 6.3 mg/dL ( @ 03:38)  Magnesium, Serum: 1.8 mg/dL ( @ 23:34)  Phosphorus Level, Serum: 6.9 mg/dL ( @ 23:34)    lactate 0.5

## 2021-07-26 NOTE — CONSULT NOTE ADULT - ASSESSMENT
55 y/o female, poor historian, with pmhx of DM on glipizide presents to ER c/o weakness and lethargy.    noted with NEIL with AG metabolic acidosis in setting of low lactate level in setting of metformin use PTA   sepsis due to cellullitis with chronic venous stasis   hyperkalemia with neil in setting of losartan and kcl intake     PLAN   - agree with bicarbonate drip, inc rate limited by wheezing   - kayexelate given and will start lokelma bid and fu trend of k   - Inc Uop noted and will fu trend of the scr  - obtain baseline labs from pmd,, office contacted   - metformin, kcl and losartan on hold   - on vanc daily, fu levels closely   - check lactate  55 y/o female, poor historian, with pmhx of DM on glipizide presents to ER c/o weakness and lethargy.    noted with NEIL with AG metabolic acidosis in setting of low lactate level in setting of metformin use PTA   sepsis due to cellullitis with chronic venous stasis   hyperkalemia with neil in setting of losartan and kcl intake     PLAN   - agree with bicarbonate drip, inc rate limited by wheezing   - kayexelate given and will start lokelma bid and fu trend of k   - Inc Uop noted and will fu trend of the scr  - obtain baseline labs from pmd,, office contacted   - metformin, kcl and losartan on hold   - on vanc daily, fu levels closely   - check lactate     addenum   labs from pmd 9/2019 scr 0.8

## 2021-07-26 NOTE — DIETITIAN INITIAL EVALUATION ADULT. - ADD RECOMMEND
1) encourage protein intake (if suboptimal, add gelatein TID); given labs, rec'd add renal, consistent carb restrictions to diet Rx 2) add MVI with minerals daily to ensure 100% RDI met 3) consider checking vitamin D level and supplement prn 4) daily wt checks to track/trend changes

## 2021-07-26 NOTE — DIETITIAN INITIAL EVALUATION ADULT. - PERTINENT MEDS FT
MEDICATIONS  (STANDING):  ammonium lactate 12% Lotion 1 Application(s) Topical two times a day  budesonide  80 MICROgram(s)/formoterol 4.5 MICROgram(s) Inhaler 2 Puff(s) Inhalation two times a day  cefTRIAXone Injectable. 1000 milliGRAM(s) IV Push every 24 hours  chlorhexidine 2% Cloths 1 Application(s) Topical <User Schedule>  dextrose 40% Gel 15 Gram(s) Oral once  dextrose 50% Injectable 25 Gram(s) IV Push once  dextrose 50% Injectable 12.5 Gram(s) IV Push once  dextrose 50% Injectable 25 Gram(s) IV Push once  glucagon  Injectable 1 milliGRAM(s) IntraMuscular once  heparin   Injectable 5000 Unit(s) SubCutaneous every 8 hours  methylPREDNISolone sodium succinate Injectable 40 milliGRAM(s) IV Push every 8 hours  nystatin Powder 1 Application(s) Topical two times a day  sodium bicarbonate  Infusion 0.331 mEq/kG/Hr (200 mL/Hr) IV Continuous <Continuous>    MEDICATIONS  (PRN):  ALBUTerol    90 MICROgram(s) HFA Inhaler 2 Puff(s) Inhalation every 6 hours PRN Shortness of Breath and/or Wheezing

## 2021-07-26 NOTE — PROVIDER CONTACT NOTE (OTHER) - SITUATION
PVD, non palpable pulses, venous stasis dermatitis with ulcerations B/L.
jaspreet, hyperkalemia
Sepsis note

## 2021-07-26 NOTE — PROGRESS NOTE ADULT - SUBJECTIVE AND OBJECTIVE BOX
ICU Progress Note    HPI:    S:    Pt seen and examined  HD # 2  FULL CODE  PMHx poor historian, with pmhx of DM on glipizide   Pt here for c/o weakness and lethargy  Found to be hypoglycemic to 50 in field, received amp of D50 and improved to 84 by arrival to ER. States she has been having diarrhea for last few days. Denies any changes in urination, abdominal pain, shortness of breath, chest pain, headache, fevers, cough.    States she took normal dose of glipizide this morning and has not had decreased PO intake. Denies any substance abuse or etoh abuse. Very poorly kept with severe venous stasis dermatitis. was on chronic keflex and states it was helping but she stopped taking it.     Found to be in acute renal failure with ph of 7.09, Cr 2.9, K 6.5, serum CO2 of 12. received calcium, albuterol, and bicarb in ER  Admitted to ICU     PM: Remains in NEIL, making urine. Hyperkalemic, given binding agent, having BM's. remains with suboptimal BG control.    ROS: Weakness, improving    Allergies    ampicillin (Unknown)    Intolerances    MEDICATIONS  (STANDING):    ammonium lactate 12% Lotion 1 Application(s) Topical two times a day  budesonide  80 MICROgram(s)/formoterol 4.5 MICROgram(s) Inhaler 2 Puff(s) Inhalation two times a day  cefTRIAXone Injectable. 1000 milliGRAM(s) IV Push <User Schedule>  chlorhexidine 2% Cloths 1 Application(s) Topical <User Schedule>  dextrose 40% Gel 15 Gram(s) Oral once  dextrose 5%. 1000 milliLiter(s) (50 mL/Hr) IV Continuous <Continuous>  dextrose 5%. 1000 milliLiter(s) (100 mL/Hr) IV Continuous <Continuous>  dextrose 50% Injectable 25 Gram(s) IV Push once  dextrose 50% Injectable 12.5 Gram(s) IV Push once  dextrose 50% Injectable 25 Gram(s) IV Push once  glucagon  Injectable 1 milliGRAM(s) IntraMuscular once  heparin   Injectable 5000 Unit(s) SubCutaneous every 8 hours  insulin lispro (ADMELOG) corrective regimen sliding scale   SubCutaneous Before meals and at bedtime  methylPREDNISolone sodium succinate Injectable 40 milliGRAM(s) IV Push every 8 hours  nystatin Powder 1 Application(s) Topical two times a day  sodium bicarbonate  Infusion 0.165 mEq/kG/Hr (100 mL/Hr) IV Continuous <Continuous>  sodium polystyrene sulfonate Suspension 30 Gram(s) Oral once    MEDICATIONS  (PRN):    ALBUTerol    90 MICROgram(s) HFA Inhaler 2 Puff(s) Inhalation every 6 hours PRN Shortness of Breath and/or Wheezing    Drug Dosing Weight    Height (cm): 157.5 (2021 20:40)  Weight (kg): 90.7 (2021 20:40)  BMI (kg/m2): 36.6 (2021 20:40)  BSA (m2): 1.91 (2021 20:40)    PAST MEDICAL & SURGICAL HISTORY:  Diabetes mellitus    No significant past surgical history    FAMILY HISTORY:    ROS: See HPI; otherwise, all systems reviewed and negative.    O:    ICU Vital Signs Last 24 Hrs  T(C): 36.2 (2021 20:00), Max: 37.2 (2021 10:00)  T(F): 97.1 (2021 20:00), Max: 98.9 (2021 10:00)  HR: 83 (2021 00:00) (73 - 108)  BP: 111/54 (2021 00:00) (93/40 - 140/87)  BP(mean): 68 (2021 00:00) (55 - 99)  ABP: --  ABP(mean): --  RR: 11 (2021 00:00) (11 - 28)  SpO2: 100% (2021 00:00) (94% - 100%)    I&O's Detail    2021 07:01  -  2021 07:00  --------------------------------------------------------  IN:    IV PiggyBack: 1452 mL    Sodium Bicarbonate: 1222 mL  Total IN: 2674 mL    OUT:    Indwelling Catheter - Urethral (mL): 330 mL  Total OUT: 330 mL    Total NET: 2344 mL    2021 07:01  -  2021 01:10  --------------------------------------------------------  IN:    IV PiggyBack: 410 mL    Oral Fluid: 840 mL    Sodium Bicarbonate: 1100 mL    Sodium Bicarbonate: 200 mL  Total IN: 2550 mL    OUT:    Indwelling Catheter - Urethral (mL): 685 mL  Total OUT: 685 mL    Total NET: 1865 mL    PE:    Adult F lying in bed  No JVD trachea midline  S1S2+  CTA B/L  Abd soft NTND  Sleeping but easily arousable  Thickened skin B/L LE  Skin otherwise pink and well perfused    LABS:    CBC Full  -  ( 2021 21:05 )  WBC Count : 9.92 K/uL  RBC Count : 4.32 M/uL  Hemoglobin : 8.5 g/dL  Hematocrit : 29.1 %  Platelet Count - Automated : 186 K/uL  Mean Cell Volume : 67.4 fl  Mean Cell Hemoglobin : 19.7 pg  Mean Cell Hemoglobin Concentration : 29.2 gm/dL  Auto Neutrophil # : 7.98 K/uL  Auto Lymphocyte # : 0.80 K/uL  Auto Monocyte # : 0.70 K/uL  Auto Eosinophil # : 0.32 K/uL  Auto Basophil # : 0.05 K/uL  Auto Neutrophil % : 80.4 %  Auto Lymphocyte % : 8.1 %  Auto Monocyte % : 7.1 %  Auto Eosinophil % : 3.2 %  Auto Basophil % : 0.5 %        136  |  111<H>  |  65<H>  ----------------------------<  251<H>  5.4<H>   |  19<L>  |  2.61<H>    Ca    7.2<L>      2021 21:17  Phos  5.5       Mg     2.0         TPro  8.6<H>  /  Alb  2.6<L>  /  TBili  0.4  /  DBili  x   /  AST  43<H>  /  ALT  22  /  AlkPhos  314<H>      PT/INR - ( 2021 23:34 )   PT: 13.8 sec;   INR: 1.19 ratio         PTT - ( 2021 23:34 )  PTT:38.2 sec  Urinalysis Basic - ( 2021 22:31 )    Color: Yellow / Appearance: Slightly Turbid / S.020 / pH: x  Gluc: x / Ketone: Negative  / Bili: Negative / Urobili: Negative mg/dL   Blood: x / Protein: 30 mg/dL / Nitrite: Negative   Leuk Esterase: Negative / RBC: 0-2 /HPF / WBC 0-2   Sq Epi: x / Non Sq Epi: Few / Bacteria: Moderate      CAPILLARY BLOOD GLUCOSE      POCT Blood Glucose.: 255 mg/dL (2021 23:06)  POCT Blood Glucose.: 371 mg/dL (2021 17:05)  POCT Blood Glucose.: 249 mg/dL (2021 11:26)  POCT Blood Glucose.: 180 mg/dL (2021 04:56)  POCT Blood Glucose.: 204 mg/dL (2021 03:11)  POCT Blood Glucose.: 220 mg/dL (2021 01:32)    CARDIAC MARKERS ( 2021 08:41 )  0.132 ng/mL / x     / x     / x     / x      CARDIAC MARKERS ( 2021 03:38 )  x     / x     / 77 U/L / x     / x      CARDIAC MARKERS ( 2021 23:34 )  0.062 ng/mL / x     / x     / x     / x          LIVER FUNCTIONS - ( 2021 21:05 )  Alb: 2.6 g/dL / Pro: 8.6 gm/dL / ALK PHOS: 314 U/L / ALT: 22 U/L / AST: 43 U/L / GGT: x

## 2021-07-26 NOTE — DIETITIAN INITIAL EVALUATION ADULT. - PERTINENT LABORATORY DATA
07-26    135  |  115<H>  |  67<H>  ----------------------------<  180<H>  6.6<HH>   |  15<L>  |  2.88<H>    Ca    6.9<L>      26 Jul 2021 03:38  Phos  6.3     07-26  Mg     1.9     07-26    TPro  8.6<H>  /  Alb  2.6<L>  /  TBili  0.4  /  DBili  x   /  AST  43<H>  /  ALT  22  /  AlkPhos  314<H>  07-25

## 2021-07-27 LAB
A1C WITH ESTIMATED AVERAGE GLUCOSE RESULT: 4.7 % — SIGNIFICANT CHANGE UP (ref 4–5.6)
ANION GAP SERPL CALC-SCNC: 8 MMOL/L — SIGNIFICANT CHANGE UP (ref 5–17)
BUN SERPL-MCNC: 67 MG/DL — HIGH (ref 7–23)
CALCIUM SERPL-MCNC: 7 MG/DL — LOW (ref 8.5–10.1)
CHLORIDE SERPL-SCNC: 111 MMOL/L — HIGH (ref 96–108)
CO2 SERPL-SCNC: 21 MMOL/L — LOW (ref 22–31)
CREAT SERPL-MCNC: 2.51 MG/DL — HIGH (ref 0.5–1.3)
CULTURE RESULTS: NO GROWTH — SIGNIFICANT CHANGE UP
ESTIMATED AVERAGE GLUCOSE: 88 MG/DL — SIGNIFICANT CHANGE UP (ref 68–114)
GLUCOSE SERPL-MCNC: 228 MG/DL — HIGH (ref 70–99)
HCT VFR BLD CALC: 23.3 % — LOW (ref 34.5–45)
HCV AB S/CO SERPL IA: 0.13 S/CO — SIGNIFICANT CHANGE UP (ref 0–0.99)
HCV AB SERPL-IMP: SIGNIFICANT CHANGE UP
HGB BLD-MCNC: 7.2 G/DL — LOW (ref 11.5–15.5)
MAGNESIUM SERPL-MCNC: 1.9 MG/DL — SIGNIFICANT CHANGE UP (ref 1.6–2.6)
MCHC RBC-ENTMCNC: 20.1 PG — LOW (ref 27–34)
MCHC RBC-ENTMCNC: 30.9 GM/DL — LOW (ref 32–36)
MCV RBC AUTO: 65.1 FL — LOW (ref 80–100)
OB PNL STL: NEGATIVE — SIGNIFICANT CHANGE UP
PHOSPHATE SERPL-MCNC: 6.4 MG/DL — HIGH (ref 2.5–4.5)
PLATELET # BLD AUTO: 143 K/UL — LOW (ref 150–400)
POTASSIUM SERPL-MCNC: 5.1 MMOL/L — SIGNIFICANT CHANGE UP (ref 3.5–5.3)
POTASSIUM SERPL-SCNC: 5.1 MMOL/L — SIGNIFICANT CHANGE UP (ref 3.5–5.3)
PROT SERPL-MCNC: 7.2 G/DL — SIGNIFICANT CHANGE UP (ref 6–8.3)
PROT SERPL-MCNC: 7.2 G/DL — SIGNIFICANT CHANGE UP (ref 6–8.3)
RBC # BLD: 3.58 M/UL — LOW (ref 3.8–5.2)
RBC # FLD: 19.5 % — HIGH (ref 10.3–14.5)
SODIUM SERPL-SCNC: 140 MMOL/L — SIGNIFICANT CHANGE UP (ref 135–145)
SPECIMEN SOURCE: SIGNIFICANT CHANGE UP
WBC # BLD: 5.26 K/UL — SIGNIFICANT CHANGE UP (ref 3.8–10.5)
WBC # FLD AUTO: 5.26 K/UL — SIGNIFICANT CHANGE UP (ref 3.8–10.5)

## 2021-07-27 PROCEDURE — 99233 SBSQ HOSP IP/OBS HIGH 50: CPT

## 2021-07-27 RX ORDER — SODIUM POLYSTYRENE SULFONATE 4.1 MEQ/G
30 POWDER, FOR SUSPENSION ORAL ONCE
Refills: 0 | Status: COMPLETED | OUTPATIENT
Start: 2021-07-27 | End: 2021-07-27

## 2021-07-27 RX ORDER — OMEGA-3 ACID ETHYL ESTERS 1 G
1 CAPSULE ORAL
Qty: 0 | Refills: 0 | DISCHARGE

## 2021-07-27 RX ORDER — METOPROLOL TARTRATE 50 MG
50 TABLET ORAL DAILY
Refills: 0 | Status: DISCONTINUED | OUTPATIENT
Start: 2021-07-27 | End: 2021-07-31

## 2021-07-27 RX ORDER — VANCOMYCIN HCL 1 G
1250 VIAL (EA) INTRAVENOUS ONCE
Refills: 0 | Status: COMPLETED | OUTPATIENT
Start: 2021-07-27 | End: 2021-07-27

## 2021-07-27 RX ORDER — ASCORBIC ACID 60 MG
1 TABLET,CHEWABLE ORAL
Qty: 0 | Refills: 0 | DISCHARGE

## 2021-07-27 RX ORDER — MULTIVIT-MIN/FERROUS GLUCONATE 9 MG/15 ML
1 LIQUID (ML) ORAL
Qty: 0 | Refills: 0 | DISCHARGE

## 2021-07-27 RX ORDER — METOPROLOL TARTRATE 50 MG
1 TABLET ORAL
Qty: 0 | Refills: 0 | DISCHARGE

## 2021-07-27 RX ORDER — LEVOTHYROXINE SODIUM 125 MCG
1 TABLET ORAL
Qty: 0 | Refills: 0 | DISCHARGE

## 2021-07-27 RX ORDER — PANTOPRAZOLE SODIUM 20 MG/1
1 TABLET, DELAYED RELEASE ORAL
Qty: 0 | Refills: 0 | DISCHARGE

## 2021-07-27 RX ORDER — INSULIN LISPRO 100/ML
VIAL (ML) SUBCUTANEOUS
Refills: 0 | Status: DISCONTINUED | OUTPATIENT
Start: 2021-07-27 | End: 2021-07-31

## 2021-07-27 RX ORDER — PANTOPRAZOLE SODIUM 20 MG/1
40 TABLET, DELAYED RELEASE ORAL
Refills: 0 | Status: DISCONTINUED | OUTPATIENT
Start: 2021-07-27 | End: 2021-07-31

## 2021-07-27 RX ORDER — BUPROPION HYDROCHLORIDE 150 MG/1
1 TABLET, EXTENDED RELEASE ORAL
Qty: 0 | Refills: 0 | DISCHARGE

## 2021-07-27 RX ADMIN — ALBUTEROL 2 PUFF(S): 90 AEROSOL, METERED ORAL at 11:26

## 2021-07-27 RX ADMIN — NYSTATIN CREAM 1 APPLICATION(S): 100000 CREAM TOPICAL at 09:30

## 2021-07-27 RX ADMIN — Medication 100 MEQ/KG/HR: at 00:48

## 2021-07-27 RX ADMIN — HEPARIN SODIUM 5000 UNIT(S): 5000 INJECTION INTRAVENOUS; SUBCUTANEOUS at 23:39

## 2021-07-27 RX ADMIN — Medication 100 MEQ/KG/HR: at 14:43

## 2021-07-27 RX ADMIN — Medication 50 MILLIGRAM(S): at 10:12

## 2021-07-27 RX ADMIN — Medication 20 MILLIGRAM(S): at 23:39

## 2021-07-27 RX ADMIN — CEFTRIAXONE 1000 MILLIGRAM(S): 500 INJECTION, POWDER, FOR SOLUTION INTRAMUSCULAR; INTRAVENOUS at 23:39

## 2021-07-27 RX ADMIN — Medication 1 APPLICATION(S): at 09:29

## 2021-07-27 RX ADMIN — BUDESONIDE AND FORMOTEROL FUMARATE DIHYDRATE 2 PUFF(S): 160; 4.5 AEROSOL RESPIRATORY (INHALATION) at 07:46

## 2021-07-27 RX ADMIN — Medication 166.67 MILLIGRAM(S): at 09:29

## 2021-07-27 RX ADMIN — Medication 6: at 08:50

## 2021-07-27 RX ADMIN — Medication 10: at 12:14

## 2021-07-27 RX ADMIN — Medication 6: at 17:09

## 2021-07-27 RX ADMIN — NYSTATIN CREAM 1 APPLICATION(S): 100000 CREAM TOPICAL at 23:38

## 2021-07-27 RX ADMIN — Medication 40 MILLIGRAM(S): at 06:28

## 2021-07-27 RX ADMIN — SODIUM POLYSTYRENE SULFONATE 30 GRAM(S): 4.1 POWDER, FOR SUSPENSION ORAL at 02:28

## 2021-07-27 RX ADMIN — Medication 1 APPLICATION(S): at 23:38

## 2021-07-27 RX ADMIN — HEPARIN SODIUM 5000 UNIT(S): 5000 INJECTION INTRAVENOUS; SUBCUTANEOUS at 06:28

## 2021-07-27 RX ADMIN — HEPARIN SODIUM 5000 UNIT(S): 5000 INJECTION INTRAVENOUS; SUBCUTANEOUS at 13:11

## 2021-07-27 RX ADMIN — CHLORHEXIDINE GLUCONATE 1 APPLICATION(S): 213 SOLUTION TOPICAL at 06:29

## 2021-07-27 RX ADMIN — Medication 3: at 23:39

## 2021-07-27 RX ADMIN — PANTOPRAZOLE SODIUM 40 MILLIGRAM(S): 20 TABLET, DELAYED RELEASE ORAL at 06:29

## 2021-07-27 RX ADMIN — BUDESONIDE AND FORMOTEROL FUMARATE DIHYDRATE 2 PUFF(S): 160; 4.5 AEROSOL RESPIRATORY (INHALATION) at 21:05

## 2021-07-27 NOTE — PROGRESS NOTE ADULT - SUBJECTIVE AND OBJECTIVE BOX
Date of service: 21 @ 12:18    pt seen and examined  feels better  LEs less redness/swelling  urinating 500 cc in last shift  afebrile    ROS: no fever or chills; denies dizziness, no HA, no SOB or cough, no abdominal pain, no diarrhea or constipation; no dysuria,  no legs pain, no rashes    MEDICATIONS  (STANDING):  ammonium lactate 12% Lotion 1 Application(s) Topical two times a day  budesonide  80 MICROgram(s)/formoterol 4.5 MICROgram(s) Inhaler 2 Puff(s) Inhalation two times a day  cefTRIAXone Injectable. 1000 milliGRAM(s) IV Push <User Schedule>  chlorhexidine 2% Cloths 1 Application(s) Topical <User Schedule>  dextrose 40% Gel 15 Gram(s) Oral once  dextrose 5%. 1000 milliLiter(s) (50 mL/Hr) IV Continuous <Continuous>  dextrose 5%. 1000 milliLiter(s) (100 mL/Hr) IV Continuous <Continuous>  dextrose 50% Injectable 25 Gram(s) IV Push once  dextrose 50% Injectable 12.5 Gram(s) IV Push once  dextrose 50% Injectable 25 Gram(s) IV Push once  glucagon  Injectable 1 milliGRAM(s) IntraMuscular once  heparin   Injectable 5000 Unit(s) SubCutaneous every 8 hours  insulin lispro (ADMELOG) corrective regimen sliding scale   SubCutaneous Before meals and at bedtime  methylPREDNISolone sodium succinate Injectable 20 milliGRAM(s) IV Push every 12 hours  metoprolol succinate ER 50 milliGRAM(s) Oral daily  nystatin Powder 1 Application(s) Topical two times a day  pantoprazole    Tablet 40 milliGRAM(s) Oral before breakfast  sodium bicarbonate  Infusion 0.165 mEq/kG/Hr (100 mL/Hr) IV Continuous <Continuous>    Vital Signs Last 24 Hrs  T(C): 36.7 (2021 10:00), Max: 36.7 (2021 14:00)  T(F): 98 (2021 10:00), Max: 98 (2021 14:00)  HR: 92 (2021 12:00) (82 - 108)  BP: 136/60 (2021 12:00) (93/40 - 154/72)  BP(mean): 77 (2021 12:00) (55 - 97)  RR: 24 (2021 12:00) (11 - 25)  SpO2: 100% (2021 12:00) (92% - 100%)            PE:  Constitutional: frail looking  HEENT: NC/AT, EOMI, PERRLA, conjunctivae clear; ears and nose atraumatic; pharynx benign  Neck: supple; thyroid not palpable  Back: no tenderness  Respiratory: respiratory effort normal; clear to auscultation  Cardiovascular: S1S2 regular, no murmurs  Abdomen: soft, not tender, not distended, positive BS; liver and spleen WNL  Genitourinary: no suprapubic tenderness  Lymphatic: no LN palpable  Musculoskeletal: no muscle tenderness, no joint swelling or tenderness  Extremities: b/l LE skin thickening, weeping wounds, foul smell warmth  Neurological/ Psychiatric: AxOx3, Judgement and insight normal;  moving all extremities  Skin: no rashes; no palpable lesions    Labs: all available labs reviewed                                   7.2    5.  )-----------( 143      ( 2021 05:42 )             23.3         140  |  111<H>  |  67<H>  ----------------------------<  228<H>  5.1   |  21<L>  |  2.51<H>    Ca    7.0<L>      2021 05:42  Phos  6.4       Mg     1.9         TPro  8.6<H>  /  Alb  2.6<L>  /  TBili  0.4  /  DBili  x   /  AST  43<H>  /  ALT  22  /  AlkPhos  314<H>         Vancomycin Level, Trough: 14.7 ug/mL ( @ 21:12)      Urinalysis Basic - ( 2021 22:31 )    Color: Yellow / Appearance: Slightly Turbid / S.020 / pH: x  Gluc: x / Ketone: Negative  / Bili: Negative / Urobili: Negative mg/dL   Blood: x / Protein: 30 mg/dL / Nitrite: Negative   Leuk Esterase: Negative / RBC: 0-2 /HPF / WBC 0-2   Sq Epi: x / Non Sq Epi: Few / Bacteria: Moderate      Culture - Urine (21 @ 22:31)   Specimen Source: .Urine Clean Catch (Midstream)   Culture Results:   No growth Culture - Blood (21 @ 22:02)   Specimen Source: .Blood None   Culture Results:   No growth to date.     Culture - Blood (21 @ 21:05)   Specimen Source: .Blood Blood-Peripheral   Culture Results:   No growth to date.     Radiology: all available radiological tests reviewed  < from: CT Abdomen and Pelvis No Cont (21 @ 23:52) >  EXAM:  CT ABDOMEN AND PELVIS                            PROCEDURE DATE:  2021          INTERPRETATION:  CLINICAL INFORMATION: Sepsis    COMPARISON: MR pelvis 10/14/2019.    CONTRAST/COMPLICATIONS:  IV Contrast: NONE  Oral Contrast: NONE  Complications: None reported at time of study completion    PROCEDURE:  CT of the Abdomen and Pelvis was performed.  Sagittal and coronal reformats were performed.    FINDINGS:  LOWER CHEST: Mild cardiomegaly. Trace pericardial effusion.    LIVER: Nodularhepatic contour suggestive of cirrhosis..  BILE DUCTS: Normal caliber.  GALLBLADDER: Within normal limits.  SPLEEN: 17.3 cm spleen splenomegaly  PANCREAS: Within normal limits.  ADRENALS: Within normal limits.  KIDNEYS/URETERS: Within normal limits.    BLADDER: Within normal limits.  REPRODUCTIVE ORGANS: 4.5 cm left adnexal mass with calcification not well evaluated on this noncontrast exam but grossly unchanged in size when compared to the MRI from 2019.    BOWEL: No bowel obstruction. Appendix is normal.  PERITONEUM: No ascites.  VESSELS: Atherosclerotic changes.  RETROPERITONEUM/LYMPH NODES: Mildly prominent bilateral pelvic obturator and external iliac lymph nodes are unchanged.  ABDOMINAL WALL: 4.5 x 1.8 cm right gluteal intramuscular lipoma, unchanged mild subcutaneous edema in the visualized lower abdomen and pelvis.  BONES: Within normal limits.    IMPRESSION:    Cardiomegaly and trace pericardial fluid.    Cirrhosis and splenomegaly.    Stable left adnexal mass.    Mildly prominent bilateral pelvic obturator and external iliac lymph nodes are unchanged.      Advanced directives addressed: full resuscitation

## 2021-07-27 NOTE — CDI QUERY NOTE - NSCDIOTHERTXTBX_GEN_ALL_CORE_HH
This patient was admitted with sepsis and cellulitis. Documentation reflects:    Respiratory rates: Up to 28  Oxygen therapy: Nasal cannula from 4 liters to 2 liters throughout the admission    Progress Note Adult-Critical Care Attending 7-27-21 @ 10:57  PULM: Continue NC O2, decrease solumedrol to 20 q12 H    Progress Note Adult-Nephrology Attending 7-27-21 @ 10:28  PLAN   - agree with bicarbonate drip, inc rate limited by wheezing     Is the above clinical criteria indicative of a further diagnosis?  A) Acute respiratory failure.  B) Other type of respiratory failure, please specify:  C) Other, please specify:  D) Unable to determine.

## 2021-07-27 NOTE — PROGRESS NOTE ADULT - SUBJECTIVE AND OBJECTIVE BOX
NEPHROLOGY INTERVAL HPI/OVERNIGHT EVENTS:    Date of Service: 21 @ 10:28    --Resting comfortably.  No distress.  UO 1 liter.  HPI:  57 y/o female, poor historian, with pmhx of DM on glipizide presents to ER c/o weakness and lethargy. Found to be hypoglycemic to 50 in field, received amp of D50 and improved to 84 by arrival to ER. States she has been having diarrhea for last few days. Denies any changes in urination, abdominal pain, shortness of breath, chest pain, headache, fevers, cough.    States she took normal dose of glipizide this morning and has not had decreased PO intake. Denies any substance abuse or etoh abuse. Very poorly kept with severe venous stasis dermatitis. was on chronic keflex and states it was helping but she stopped taking it.     Found to be in acute renal failure with ph of 7.09, Cr 2.9, K 6.5, serum CO2 of 12. received calcium, albuterol, and bicarb in ER. (2021 22:52)  Patient is a 56y old  Female who presents with a chief complaint of acute renal failure (2021 12:45)  ---------------------------------------------  above hx obtained from pt and sister at bedside   has not seen physician and only done telehealth since pandemic  no labs obtained  has been having LE lesions with erythema and weeping since feb  has been on keflex since march   no n/v/d  no nsaid use   admits to mild ckd hx with metformin intake PTA and unable to get ct with ivc  has hx of mild chf with no cardiac eval and states she was recommended to get NST vs cardiac cath   has been fearful to leave home and has not gotten covid vaccine   denies ingestion of any etoh substances   home meds include lasix, kcl supplements and losartan and metformin     PAST MEDICAL & SURGICAL HISTORY:  - dm dx 5 yo  - ckd   - htn   - hypothy  - mild chf no cardiac fu      MEDICATIONS  (STANDING):  ammonium lactate 12% Lotion 1 Application(s) Topical two times a day  budesonide  80 MICROgram(s)/formoterol 4.5 MICROgram(s) Inhaler 2 Puff(s) Inhalation two times a day  cefTRIAXone Injectable. 1000 milliGRAM(s) IV Push <User Schedule>  chlorhexidine 2% Cloths 1 Application(s) Topical <User Schedule>  dextrose 40% Gel 15 Gram(s) Oral once  dextrose 5%. 1000 milliLiter(s) (50 mL/Hr) IV Continuous <Continuous>  dextrose 5%. 1000 milliLiter(s) (100 mL/Hr) IV Continuous <Continuous>  dextrose 50% Injectable 25 Gram(s) IV Push once  dextrose 50% Injectable 12.5 Gram(s) IV Push once  dextrose 50% Injectable 25 Gram(s) IV Push once  glucagon  Injectable 1 milliGRAM(s) IntraMuscular once  heparin   Injectable 5000 Unit(s) SubCutaneous every 8 hours  insulin lispro (ADMELOG) corrective regimen sliding scale   SubCutaneous Before meals and at bedtime  methylPREDNISolone sodium succinate Injectable 40 milliGRAM(s) IV Push every 8 hours  metoprolol succinate ER 50 milliGRAM(s) Oral daily  nystatin Powder 1 Application(s) Topical two times a day  pantoprazole    Tablet 40 milliGRAM(s) Oral before breakfast  sodium bicarbonate  Infusion 0.165 mEq/kG/Hr (100 mL/Hr) IV Continuous <Continuous>    MEDICATIONS  (PRN):  ALBUTerol    90 MICROgram(s) HFA Inhaler 2 Puff(s) Inhalation every 6 hours PRN Shortness of Breath and/or Wheezing    Vital Signs Last 24 Hrs  T(C): 36.2 (2021 04:00), Max: 36.7 (2021 14:00)  T(F): 97.1 (2021 04:00), Max: 98 (2021 14:00)  HR: 99 (2021 10:00) (82 - 108)  BP: 118/54 (2021 10:00) (93/40 - 154/72)  BP(mean): 64 (2021 10:00) (55 - 97)  RR: 24 (2021 10:00) (11 - 25)  SpO2: 98% (2021 10:00) (92% - 100%)      - @ 07:01  -   @ 07:00  --------------------------------------------------------  IN: 3681 mL / OUT: 1005 mL / NET: 2676 mL     @ 07:01  -   @ 10:28  --------------------------------------------------------  IN: 250 mL / OUT: 0 mL / NET: 250 mL    PHYSICAL EXAM:  GENERAL: comfortable.  CHEST/LUNG: fair air entry  HEART: S1S2 RRR  ABDOMEN: soft  EXTREMITIES: chronic venous stasis.  SKIN:     LABS:                        7.2    5.26  )-----------( 143      ( 2021 05:42 )             23.3         140  |  111<H>  |  67<H>  ----------------------------<  228<H>  5.1   |  21<L>  |  2.51<H>    Ca    7.0<L>      2021 05:42  Phos  6.4       Mg     1.9         TPro  8.6<H>  /  Alb  2.6<L>  /  TBili  0.4  /  DBili  x   /  AST  43<H>  /  ALT  22  /  AlkPhos  314<H>      PT/INR - ( 2021 23:34 )   PT: 13.8 sec;   INR: 1.19 ratio         PTT - ( 2021 23:34 )  PTT:38.2 sec  Urinalysis Basic - ( 2021 22:31 )    Color: Yellow / Appearance: Slightly Turbid / S.020 / pH: x  Gluc: x / Ketone: Negative  / Bili: Negative / Urobili: Negative mg/dL   Blood: x / Protein: 30 mg/dL / Nitrite: Negative   Leuk Esterase: Negative / RBC: 0-2 /HPF / WBC 0-2   Sq Epi: x / Non Sq Epi: Few / Bacteria: Moderate      Magnesium, Serum: 1.9 mg/dL ( @ 05:42)  Phosphorus Level, Serum: 6.4 mg/dL ( @ 05:42)          RADIOLOGY & ADDITIONAL TESTS:

## 2021-07-27 NOTE — PROGRESS NOTE ADULT - ASSESSMENT
A/P: 56 female who presents with LE cellulitis and sepsis from it, NEIL with hyperkalemia and acidosis  DM II  HTN      Plan:   ICU    PULM: Continue NC O2, decrease solumedrol to 20 q12 H    Cardio: Hemodynamics are reasonable.      Renal: NEIL likely from ATN and slowly improving, hyperkalemia improved.  Will continue the arriola one more day as well as the bicarb drip.      GI: ADA diet    ENDO: RISS    ID: Consult noted, Continue Rocephin for cellulitis    Vascular following and to place sameer boots    Transfer to a reg floor  Called in to the Hospitalist at 11:20 AM

## 2021-07-27 NOTE — PROGRESS NOTE ADULT - SUBJECTIVE AND OBJECTIVE BOX
HPI:  55 y/o female, poor historian, with pmhx of DM on glipizide presents to ER c/o weakness and lethargy. Found to be hypoglycemic to 50 in field, received amp of D50 and improved to 84 by arrival to ER. States she has been having diarrhea for last few days.  States she took normal dose of glipizide and has not had decreased PO intake. Denies any substance abuse or etoh abuse. Very poorly kept with severe venous stasis dermatitis. Was on chronic keflex and states it was helping but she stopped taking it.  Found to be in acute renal failure with ph of 7.09, Cr 2.9, K 6.5, serum CO2 of 12. Received calcium, albuterol, and bicarb in ER.    : Patient is awake and alert, making urine, has received multiple rounds of the cocktail but not a binding resin and her K remains elevated.    : Haim slowly improving, hyperkalemia improved, tolerating PO        PAST MEDICAL & SURGICAL HISTORY:  Diabetes mellitus    No significant past surgical history        FAMILY HISTORY:      Social Hx:    Allergies    ampicillin (Unknown)    Intolerances            ICU Vital Signs Last 24 Hrs  T(C): 36.2 (2021 04:00), Max: 36.7 (2021 14:00)  T(F): 97.1 (2021 04:00), Max: 98 (2021 14:00)  HR: 99 (2021 10:00) (82 - 108)  BP: 118/54 (2021 10:00) (93/40 - 154/72)  BP(mean): 64 (2021 10:00) (55 - 97)  ABP: --  ABP(mean): --  RR: 24 (2021 10:00) (11 - 25)  SpO2: 98% (2021 10:00) (92% - 100%)          I&O's Summary    2021 07:01  -  2021 07:00  --------------------------------------------------------  IN: 3681 mL / OUT: 1005 mL / NET: 2676 mL    2021 07:01  -  2021 10:58  --------------------------------------------------------  IN: 250 mL / OUT: 0 mL / NET: 250 mL                              7.2    5.26  )-----------( 143      ( 2021 05:42 )             23.3           140  |  111<H>  |  67<H>  ----------------------------<  228<H>  5.1   |  21<L>  |  2.51<H>    Ca    7.0<L>      2021 05:42  Phos  6.4       Mg     1.9         TPro  8.6<H>  /  Alb  2.6<L>  /  TBili  0.4  /  DBili  x   /  AST  43<H>  /  ALT  22  /  AlkPhos  314<H>        CARDIAC MARKERS ( 2021 08:41 )  0.132 ng/mL / x     / x     / x     / x      CARDIAC MARKERS ( 2021 03:38 )  x     / x     / 77 U/L / x     / x      CARDIAC MARKERS ( 2021 23:34 )  0.062 ng/mL / x     / x     / x     / x                Urinalysis Basic - ( 2021 22:31 )    Color: Yellow / Appearance: Slightly Turbid / S.020 / pH: x  Gluc: x / Ketone: Negative  / Bili: Negative / Urobili: Negative mg/dL   Blood: x / Protein: 30 mg/dL / Nitrite: Negative   Leuk Esterase: Negative / RBC: 0-2 /HPF / WBC 0-2   Sq Epi: x / Non Sq Epi: Few / Bacteria: Moderate        MEDICATIONS  (STANDING):  ammonium lactate 12% Lotion 1 Application(s) Topical two times a day  budesonide  80 MICROgram(s)/formoterol 4.5 MICROgram(s) Inhaler 2 Puff(s) Inhalation two times a day  cefTRIAXone Injectable. 1000 milliGRAM(s) IV Push <User Schedule>  chlorhexidine 2% Cloths 1 Application(s) Topical <User Schedule>  dextrose 40% Gel 15 Gram(s) Oral once  dextrose 5%. 1000 milliLiter(s) (50 mL/Hr) IV Continuous <Continuous>  dextrose 5%. 1000 milliLiter(s) (100 mL/Hr) IV Continuous <Continuous>  dextrose 50% Injectable 25 Gram(s) IV Push once  dextrose 50% Injectable 12.5 Gram(s) IV Push once  dextrose 50% Injectable 25 Gram(s) IV Push once  glucagon  Injectable 1 milliGRAM(s) IntraMuscular once  heparin   Injectable 5000 Unit(s) SubCutaneous every 8 hours  insulin lispro (ADMELOG) corrective regimen sliding scale   SubCutaneous Before meals and at bedtime  methylPREDNISolone sodium succinate Injectable 40 milliGRAM(s) IV Push every 8 hours  metoprolol succinate ER 50 milliGRAM(s) Oral daily  nystatin Powder 1 Application(s) Topical two times a day  pantoprazole    Tablet 40 milliGRAM(s) Oral before breakfast  sodium bicarbonate  Infusion 0.165 mEq/kG/Hr (100 mL/Hr) IV Continuous <Continuous>    MEDICATIONS  (PRN):  ALBUTerol    90 MICROgram(s) HFA Inhaler 2 Puff(s) Inhalation every 6 hours PRN Shortness of Breath and/or Wheezing      DVT Prophylaxis: [facility]    Advanced Directives:  Discussed with:    Visit Information:    ** Time is exclusive of billed procedures and/or teaching and/or routine family updates.

## 2021-07-27 NOTE — PROGRESS NOTE ADULT - ASSESSMENT
55 y/o female, poor historian, with pmhx of DM on glipizide presents to ER c/o weakness and lethargy. Found to be hypoglycemic to 50 in field, received amp of D50 and improved to 84 by arrival to ER. States she has been having diarrhea for last few days. Denies any changes in urination, abdominal pain, shortness of breath, chest pain, headache, fevers, cough.States she took normal dose of glipizide this morning and has not had decreased PO intake. Very poorly kept with severe venous stasis dermatitis. was on chronic keflex and states it was helping but she stopped taking it. Found to be in acute renal failure with ph of 7.09, Cr 2.9, K 6.5, serum CO2 of 12. received calcium, albuterol, and bicarb in ER. Has chronic LE stasis dermatitis weeping wounds with foul smell, concern for cellulitis, given vanco/rocephin.    1. b/l LE chronic stasis dermatitis. venous stasis. superimposed cellulitis. NEIL  - slowly improving  - on vancomycin by level #3, switch to po doxy in am  - on rocephin 1gm daily #3  - continue with abx coverage  - blood cx no growth  - monitor temps  - wound care, whirpool tx if able  - monitor temps  - tolerating abx well so far; no side effects noted  - reason for abx use and side effects reviewed with patient  - supportive care  - fu cbc    2. other issues - care per medicine

## 2021-07-27 NOTE — PROGRESS NOTE ADULT - ASSESSMENT
55 y/o female, poor historian, with pmhx of DM on glipizide presents to ER c/o weakness and lethargy.    noted with NEIL with AG metabolic acidosis in setting of low lactate level in setting of metformin use PTA   sepsis due to cellullitis with chronic venous stasis   hyperkalemia with neil in setting of losartan and kcl intake     PLAN   - agree with bicarbonate drip, inc rate limited by wheezing   - kayexelate given and will start lokelma bid and fu trend of k   - Inc Uop noted and will fu trend of the scr  - obtain baseline labs from pmd,, office contacted   - metformin, kcl and losartan on hold   - on vanc daily, fu levels closely   - check lactate     addenum   labs from pmd 9/2019 scr 0.8    7/27 SY  --NEIL : Creat slightly improved with increased uo.   Continue IVF /HCO3 for now.   Continue Tristan for another day to monitor UO.  --Hyperkalemia : resolved.  No further tx.   Continue to hold ARB.  --Anemia of unclear etiology--send work up.

## 2021-07-28 LAB
ANION GAP SERPL CALC-SCNC: 7 MMOL/L — SIGNIFICANT CHANGE UP (ref 5–17)
BUN SERPL-MCNC: 71 MG/DL — HIGH (ref 7–23)
CALCIUM SERPL-MCNC: 6.7 MG/DL — LOW (ref 8.5–10.1)
CHLORIDE SERPL-SCNC: 104 MMOL/L — SIGNIFICANT CHANGE UP (ref 96–108)
CO2 SERPL-SCNC: 25 MMOL/L — SIGNIFICANT CHANGE UP (ref 22–31)
CREAT SERPL-MCNC: 2.66 MG/DL — HIGH (ref 0.5–1.3)
FERRITIN SERPL-MCNC: 102 NG/ML — SIGNIFICANT CHANGE UP (ref 15–150)
GLUCOSE SERPL-MCNC: 95 MG/DL — SIGNIFICANT CHANGE UP (ref 70–99)
HCT VFR BLD CALC: 24 % — LOW (ref 34.5–45)
HGB BLD-MCNC: 7.1 G/DL — LOW (ref 11.5–15.5)
IRON SATN MFR SERPL: 24 % — SIGNIFICANT CHANGE UP (ref 14–50)
IRON SATN MFR SERPL: 52 UG/DL — SIGNIFICANT CHANGE UP (ref 30–160)
MAGNESIUM SERPL-MCNC: 1.9 MG/DL — SIGNIFICANT CHANGE UP (ref 1.6–2.6)
MCHC RBC-ENTMCNC: 19.5 PG — LOW (ref 27–34)
MCHC RBC-ENTMCNC: 29.6 GM/DL — LOW (ref 32–36)
MCV RBC AUTO: 65.8 FL — LOW (ref 80–100)
PHOSPHATE SERPL-MCNC: 5.9 MG/DL — HIGH (ref 2.5–4.5)
PLATELET # BLD AUTO: 163 K/UL — SIGNIFICANT CHANGE UP (ref 150–400)
POTASSIUM SERPL-MCNC: 4.6 MMOL/L — SIGNIFICANT CHANGE UP (ref 3.5–5.3)
POTASSIUM SERPL-SCNC: 4.6 MMOL/L — SIGNIFICANT CHANGE UP (ref 3.5–5.3)
RBC # BLD: 3.65 M/UL — LOW (ref 3.8–5.2)
RBC # FLD: 19.2 % — HIGH (ref 10.3–14.5)
SODIUM SERPL-SCNC: 136 MMOL/L — SIGNIFICANT CHANGE UP (ref 135–145)
TIBC SERPL-MCNC: 217 UG/DL — LOW (ref 220–430)
UIBC SERPL-MCNC: 165 UG/DL — SIGNIFICANT CHANGE UP (ref 110–370)
VIT B12 SERPL-MCNC: 910 PG/ML — SIGNIFICANT CHANGE UP (ref 232–1245)
WBC # BLD: 7.91 K/UL — SIGNIFICANT CHANGE UP (ref 3.8–10.5)
WBC # FLD AUTO: 7.91 K/UL — SIGNIFICANT CHANGE UP (ref 3.8–10.5)

## 2021-07-28 PROCEDURE — 99233 SBSQ HOSP IP/OBS HIGH 50: CPT

## 2021-07-28 PROCEDURE — 99232 SBSQ HOSP IP/OBS MODERATE 35: CPT

## 2021-07-28 RX ORDER — LEVOTHYROXINE SODIUM 125 MCG
25 TABLET ORAL DAILY
Refills: 0 | Status: DISCONTINUED | OUTPATIENT
Start: 2021-07-28 | End: 2021-07-31

## 2021-07-28 RX ORDER — NICOTINE POLACRILEX 2 MG
1 GUM BUCCAL DAILY
Refills: 0 | Status: DISCONTINUED | OUTPATIENT
Start: 2021-07-28 | End: 2021-07-31

## 2021-07-28 RX ADMIN — Medication 20 MILLIGRAM(S): at 09:53

## 2021-07-28 RX ADMIN — Medication 20 MILLIGRAM(S): at 23:29

## 2021-07-28 RX ADMIN — NYSTATIN CREAM 1 APPLICATION(S): 100000 CREAM TOPICAL at 23:38

## 2021-07-28 RX ADMIN — HEPARIN SODIUM 5000 UNIT(S): 5000 INJECTION INTRAVENOUS; SUBCUTANEOUS at 23:29

## 2021-07-28 RX ADMIN — CEFTRIAXONE 1000 MILLIGRAM(S): 500 INJECTION, POWDER, FOR SOLUTION INTRAMUSCULAR; INTRAVENOUS at 23:28

## 2021-07-28 RX ADMIN — HEPARIN SODIUM 5000 UNIT(S): 5000 INJECTION INTRAVENOUS; SUBCUTANEOUS at 17:19

## 2021-07-28 RX ADMIN — Medication 100 MEQ/KG/HR: at 05:33

## 2021-07-28 RX ADMIN — Medication 1 PATCH: at 18:11

## 2021-07-28 RX ADMIN — Medication 25 MICROGRAM(S): at 09:54

## 2021-07-28 RX ADMIN — Medication 6: at 17:20

## 2021-07-28 RX ADMIN — Medication 100 MILLIGRAM(S): at 23:50

## 2021-07-28 RX ADMIN — CHLORHEXIDINE GLUCONATE 1 APPLICATION(S): 213 SOLUTION TOPICAL at 05:32

## 2021-07-28 RX ADMIN — Medication 1 APPLICATION(S): at 23:38

## 2021-07-28 RX ADMIN — HEPARIN SODIUM 5000 UNIT(S): 5000 INJECTION INTRAVENOUS; SUBCUTANEOUS at 05:33

## 2021-07-28 RX ADMIN — Medication 1 APPLICATION(S): at 09:56

## 2021-07-28 RX ADMIN — PANTOPRAZOLE SODIUM 40 MILLIGRAM(S): 20 TABLET, DELAYED RELEASE ORAL at 05:33

## 2021-07-28 RX ADMIN — Medication 50 MILLIGRAM(S): at 09:55

## 2021-07-28 RX ADMIN — BUDESONIDE AND FORMOTEROL FUMARATE DIHYDRATE 2 PUFF(S): 160; 4.5 AEROSOL RESPIRATORY (INHALATION) at 20:53

## 2021-07-28 RX ADMIN — NYSTATIN CREAM 1 APPLICATION(S): 100000 CREAM TOPICAL at 09:56

## 2021-07-28 RX ADMIN — BUDESONIDE AND FORMOTEROL FUMARATE DIHYDRATE 2 PUFF(S): 160; 4.5 AEROSOL RESPIRATORY (INHALATION) at 09:31

## 2021-07-28 RX ADMIN — Medication 3: at 12:45

## 2021-07-28 NOTE — PROGRESS NOTE ADULT - SUBJECTIVE AND OBJECTIVE BOX
Date of service: 21 @ 12:40    pt seen and examined  feels better  LEs less redness/swelling  afebrile  sitting up in chair    ROS: no fever or chills; denies dizziness, no HA, no SOB or cough, no abdominal pain, no diarrhea or constipation; no dysuria,  no legs pain, no rashes    MEDICATIONS  (STANDING):  ammonium lactate 12% Lotion 1 Application(s) Topical two times a day  budesonide  80 MICROgram(s)/formoterol 4.5 MICROgram(s) Inhaler 2 Puff(s) Inhalation two times a day  cefTRIAXone Injectable. 1000 milliGRAM(s) IV Push <User Schedule>  chlorhexidine 2% Cloths 1 Application(s) Topical <User Schedule>  dextrose 40% Gel 15 Gram(s) Oral once  dextrose 5%. 1000 milliLiter(s) (50 mL/Hr) IV Continuous <Continuous>  dextrose 5%. 1000 milliLiter(s) (100 mL/Hr) IV Continuous <Continuous>  dextrose 50% Injectable 25 Gram(s) IV Push once  dextrose 50% Injectable 12.5 Gram(s) IV Push once  dextrose 50% Injectable 25 Gram(s) IV Push once  doxycycline hyclate Capsule 100 milliGRAM(s) Oral every 12 hours  glucagon  Injectable 1 milliGRAM(s) IntraMuscular once  heparin   Injectable 5000 Unit(s) SubCutaneous every 8 hours  insulin lispro (ADMELOG) corrective regimen sliding scale   SubCutaneous Before meals and at bedtime  levothyroxine 25 MICROGram(s) Oral daily  methylPREDNISolone sodium succinate Injectable 20 milliGRAM(s) IV Push every 12 hours  metoprolol succinate ER 50 milliGRAM(s) Oral daily  nystatin Powder 1 Application(s) Topical two times a day  pantoprazole    Tablet 40 milliGRAM(s) Oral before breakfast    Vital Signs Last 24 Hrs  T(C): 36.7 (2021 12:00), Max: 36.7 (2021 18:00)  T(F): 98 (2021 12:00), Max: 98 (2021 18:00)  HR: 84 (2021 12:00) (77 - 108)  BP: 113/60 (2021 12:00) (97/82 - 146/55)  BP(mean): 69 (2021 12:00) (59 - 105)  RR: 17 (2021 12:00) (11 - 28)  SpO2: 99% (2021 12:00) (93% - 100%)        PE:  Constitutional: frail looking  HEENT: NC/AT, EOMI, PERRLA, conjunctivae clear; ears and nose atraumatic; pharynx benign  Neck: supple; thyroid not palpable  Back: no tenderness  Respiratory: respiratory effort normal; clear to auscultation  Cardiovascular: S1S2 regular, no murmurs  Abdomen: soft, not tender, not distended, positive BS; liver and spleen WNL  Genitourinary: no suprapubic tenderness  Lymphatic: no LN palpable  Musculoskeletal: no muscle tenderness, no joint swelling or tenderness  Extremities: b/l LE skin thickening, weeping wounds, foul smell warmth  Neurological/ Psychiatric: AxOx3, Judgement and insight normal;  moving all extremities  Skin: no rashes; no palpable lesions    Labs: all available labs reviewed                                              7.1    7.91  )-----------( 163      ( 2021 05:57 )             24.0         136  |  104  |  71<H>  ----------------------------<  95  4.6   |  25  |  2.66<H>    Ca    6.7<L>      2021 05:57  Phos  5.9       Mg     1.9         TPro  7.2  /  Alb  x   /  TBili  x   /  DBili  x   /  AST  x   /  ALT  x   /  AlkPhos  x          Vancomycin Level, Trough: 14.7 ug/mL ( @ 21:12)        Urinalysis Basic - ( 2021 22:31 )    Color: Yellow / Appearance: Slightly Turbid / S.020 / pH: x  Gluc: x / Ketone: Negative  / Bili: Negative / Urobili: Negative mg/dL   Blood: x / Protein: 30 mg/dL / Nitrite: Negative   Leuk Esterase: Negative / RBC: 0-2 /HPF / WBC 0-2   Sq Epi: x / Non Sq Epi: Few / Bacteria: Moderate      Culture - Urine (21 @ 22:31)   Specimen Source: .Urine Clean Catch (Midstream)   Culture Results:   No growth Culture - Blood (21 @ 22:02)   Specimen Source: .Blood None   Culture Results:   No growth to date.     Culture - Blood (21 @ 21:05)   Specimen Source: .Blood Blood-Peripheral   Culture Results:   No growth to date.     Radiology: all available radiological tests reviewed  < from: CT Abdomen and Pelvis No Cont (21 @ 23:52) >  EXAM:  CT ABDOMEN AND PELVIS                            PROCEDURE DATE:  2021          INTERPRETATION:  CLINICAL INFORMATION: Sepsis    COMPARISON: MR pelvis 10/14/2019.    CONTRAST/COMPLICATIONS:  IV Contrast: NONE  Oral Contrast: NONE  Complications: None reported at time of study completion    PROCEDURE:  CT of the Abdomen and Pelvis was performed.  Sagittal and coronal reformats were performed.    FINDINGS:  LOWER CHEST: Mild cardiomegaly. Trace pericardial effusion.    LIVER: Nodularhepatic contour suggestive of cirrhosis..  BILE DUCTS: Normal caliber.  GALLBLADDER: Within normal limits.  SPLEEN: 17.3 cm spleen splenomegaly  PANCREAS: Within normal limits.  ADRENALS: Within normal limits.  KIDNEYS/URETERS: Within normal limits.    BLADDER: Within normal limits.  REPRODUCTIVE ORGANS: 4.5 cm left adnexal mass with calcification not well evaluated on this noncontrast exam but grossly unchanged in size when compared to the MRI from 2019.    BOWEL: No bowel obstruction. Appendix is normal.  PERITONEUM: No ascites.  VESSELS: Atherosclerotic changes.  RETROPERITONEUM/LYMPH NODES: Mildly prominent bilateral pelvic obturator and external iliac lymph nodes are unchanged.  ABDOMINAL WALL: 4.5 x 1.8 cm right gluteal intramuscular lipoma, unchanged mild subcutaneous edema in the visualized lower abdomen and pelvis.  BONES: Within normal limits.    IMPRESSION:    Cardiomegaly and trace pericardial fluid.    Cirrhosis and splenomegaly.    Stable left adnexal mass.    Mildly prominent bilateral pelvic obturator and external iliac lymph nodes are unchanged.      Advanced directives addressed: full resuscitation

## 2021-07-28 NOTE — PROGRESS NOTE ADULT - ASSESSMENT
57 y/o female, poor historian, with pmhx of DM on glipizide presents to ER c/o weakness and lethargy.    noted with NEIL with AG metabolic acidosis in setting of low lactate level in setting of metformin use PTA   sepsis due to cellullitis with chronic venous stasis   hyperkalemia with neil in setting of losartan and kcl intake     PLAN   - agree with bicarbonate drip, inc rate limited by wheezing   - kayexelate given and will start lokelma bid and fu trend of k   - Inc Uop noted and will fu trend of the scr  - obtain baseline labs from pmd,, office contacted   - metformin, kcl and losartan on hold   - on vanc daily, fu levels closely   - check lactate     addenum   labs from pmd 9/2019 scr 0.8    7/27 SY  --NEIL : Creat slightly improved with increased uo.   Continue IVF /HCO3 for now.   Continue Tristan for another day to monitor UO.  --Hyperkalemia : resolved.  No further tx.   Continue to hold ARB.  --Anemia of unclear etiology--send work up.    7/28 SY  --NEIL : Renal parameters holding fairly steady. Now off IVF and monitor    D/c Tristan  --Electrolytes improved and stable.  --Anemia : follow up SPEP/JAMIE,

## 2021-07-28 NOTE — PROGRESS NOTE ADULT - SUBJECTIVE AND OBJECTIVE BOX
HPI:  55 y/o female, poor historian, with pmhx of DM on glipizide presents to ER c/o weakness and lethargy. Found to be hypoglycemic to 50 in field, received amp of D50 and improved to 84 by arrival to ER. States she has been having diarrhea for last few days.  States she took normal dose of glipizide and has not had decreased PO intake. Denies any substance abuse or etoh abuse. Very poorly kept with severe venous stasis dermatitis. Was on chronic keflex and states it was helping but she stopped taking it.  Found to be in acute renal failure with ph of 7.09, Cr 2.9, K 6.5, serum CO2 of 12. Received calcium, albuterol, and bicarb in ER.    7/26: Patient is awake and alert, making urine, has received multiple rounds of the cocktail but not a binding resin and her K remains elevated.    7/27: NEIL slowly improving, hyperkalemia improved, tolerating PO  7/28: No events over night, slight rise in Cr.  Potassium stable            PAST MEDICAL & SURGICAL HISTORY:  Diabetes mellitus    No significant past surgical history        FAMILY HISTORY:      Social Hx:    Allergies    ampicillin (Unknown)    Intolerances            ICU Vital Signs Last 24 Hrs  T(C): 36.5 (28 Jul 2021 08:00), Max: 36.7 (27 Jul 2021 18:00)  T(F): 97.7 (28 Jul 2021 08:00), Max: 98 (27 Jul 2021 18:00)  HR: 107 (28 Jul 2021 09:33) (77 - 108)  BP: 120/49 (28 Jul 2021 08:00) (97/82 - 146/55)  BP(mean): 67 (28 Jul 2021 08:00) (59 - 105)  ABP: --  ABP(mean): --  RR: 21 (28 Jul 2021 08:00) (11 - 28)  SpO2: 100% (28 Jul 2021 08:00) (93% - 100%)          I&O's Summary    27 Jul 2021 07:01  -  28 Jul 2021 07:00  --------------------------------------------------------  IN: 2712 mL / OUT: 785 mL / NET: 1927 mL    28 Jul 2021 07:01  -  28 Jul 2021 12:18  --------------------------------------------------------  IN: 587 mL / OUT: 0 mL / NET: 587 mL                              7.1    7.91  )-----------( 163      ( 28 Jul 2021 05:57 )             24.0       07-28    136  |  104  |  71<H>  ----------------------------<  95  4.6   |  25  |  2.66<H>    Ca    6.7<L>      28 Jul 2021 05:57  Phos  5.9     07-28  Mg     1.9     07-28    TPro  7.2  /  Alb  x   /  TBili  x   /  DBili  x   /  AST  x   /  ALT  x   /  AlkPhos  x   07-27                    MEDICATIONS  (STANDING):  ammonium lactate 12% Lotion 1 Application(s) Topical two times a day  budesonide  80 MICROgram(s)/formoterol 4.5 MICROgram(s) Inhaler 2 Puff(s) Inhalation two times a day  cefTRIAXone Injectable. 1000 milliGRAM(s) IV Push <User Schedule>  chlorhexidine 2% Cloths 1 Application(s) Topical <User Schedule>  dextrose 40% Gel 15 Gram(s) Oral once  dextrose 5%. 1000 milliLiter(s) (50 mL/Hr) IV Continuous <Continuous>  dextrose 5%. 1000 milliLiter(s) (100 mL/Hr) IV Continuous <Continuous>  dextrose 50% Injectable 25 Gram(s) IV Push once  dextrose 50% Injectable 12.5 Gram(s) IV Push once  dextrose 50% Injectable 25 Gram(s) IV Push once  glucagon  Injectable 1 milliGRAM(s) IntraMuscular once  heparin   Injectable 5000 Unit(s) SubCutaneous every 8 hours  insulin lispro (ADMELOG) corrective regimen sliding scale   SubCutaneous Before meals and at bedtime  levothyroxine 25 MICROGram(s) Oral daily  methylPREDNISolone sodium succinate Injectable 20 milliGRAM(s) IV Push every 12 hours  metoprolol succinate ER 50 milliGRAM(s) Oral daily  nystatin Powder 1 Application(s) Topical two times a day  pantoprazole    Tablet 40 milliGRAM(s) Oral before breakfast    MEDICATIONS  (PRN):  ALBUTerol    90 MICROgram(s) HFA Inhaler 2 Puff(s) Inhalation every 6 hours PRN Shortness of Breath and/or Wheezing      DVT Prophylaxis: [facility]    Advanced Directives:  Discussed with:    Visit Information:    ** Time is exclusive of billed procedures and/or teaching and/or routine family updates.

## 2021-07-28 NOTE — PROGRESS NOTE ADULT - ASSESSMENT
A/P: 56 female who presents with LE cellulitis and sepsis from it, NEIL with hyperkalemia and acidosis  DM II  HTN      Plan:   ICU    PULM: Continue NC O2, solumedrol to 20 q12 H    Cardio: Hemodynamics are reasonable.      Renal: NEIL likely from ATN and likely plateauing, hyperkalemia improved.  D/C Bicarb drip, D/C Tristan     GI: ADA diet    ENDO: RISS    ID: Consult noted, Continue Rocephin for cellulitis    Vascular following and to place sameer boots    Transfer to a reg floor  Called in to the Hospitalist at 11:20 AM on 7/27

## 2021-07-28 NOTE — PROGRESS NOTE ADULT - RS GEN PE MLT RESP DETAILS PC
breath sounds equal/no rales/no rhonchi/wheezes
breath sounds equal/no rales/no rhonchi/no wheezes
breath sounds equal/no rales/no rhonchi/no wheezes

## 2021-07-28 NOTE — PROGRESS NOTE ADULT - SUBJECTIVE AND OBJECTIVE BOX
CC:  Patient is a 56y old  Female who presents with a chief complaint of acute renal failure (28 Jul 2021 14:40)    SUBJECTIVE:     -no new complaints or issues at current time.    ROS:  all other review of systems are negative unless indicated above.    ALBUTerol    90 MICROgram(s) HFA Inhaler 2 Puff(s) Inhalation every 6 hours PRN  ammonium lactate 12% Lotion 1 Application(s) Topical two times a day  budesonide  80 MICROgram(s)/formoterol 4.5 MICROgram(s) Inhaler 2 Puff(s) Inhalation two times a day  cefTRIAXone Injectable. 1000 milliGRAM(s) IV Push <User Schedule>  chlorhexidine 2% Cloths 1 Application(s) Topical <User Schedule>  dextrose 40% Gel 15 Gram(s) Oral once  dextrose 5%. 1000 milliLiter(s) IV Continuous <Continuous>  dextrose 5%. 1000 milliLiter(s) IV Continuous <Continuous>  dextrose 50% Injectable 25 Gram(s) IV Push once  dextrose 50% Injectable 12.5 Gram(s) IV Push once  dextrose 50% Injectable 25 Gram(s) IV Push once  doxycycline hyclate Capsule 100 milliGRAM(s) Oral every 12 hours  glucagon  Injectable 1 milliGRAM(s) IntraMuscular once  heparin   Injectable 5000 Unit(s) SubCutaneous every 8 hours  insulin lispro (ADMELOG) corrective regimen sliding scale   SubCutaneous Before meals and at bedtime  levothyroxine 25 MICROGram(s) Oral daily  methylPREDNISolone sodium succinate Injectable 20 milliGRAM(s) IV Push every 12 hours  metoprolol succinate ER 50 milliGRAM(s) Oral daily  nicotine - 21 mG/24Hr(s) Patch 1 patch Transdermal daily  nystatin Powder 1 Application(s) Topical two times a day  pantoprazole    Tablet 40 milliGRAM(s) Oral before breakfast    T(C): 36.7 (07-28-21 @ 15:15), Max: 36.7 (07-28-21 @ 12:00)  HR: 85 (07-28-21 @ 15:15) (77 - 108)  BP: 140/59 (07-28-21 @ 15:15) (97/82 - 140/59)  RR: 18 (07-28-21 @ 15:15) (11 - 23)  SpO2: 93% (07-28-21 @ 15:15) (93% - 100%)    Constitutional: NAD.   HEENT: PERRL, EOMI, MMM.  Neck: Soft and supple, No carotid bruit, No JVD  Respiratory: Breath sounds are clear bilaterally, No wheezing, rales or rhonchi  Cardiovascular: S1 and S2, regular rate and rhythm, no murmur, rub or gallop.  Gastrointestinal: Bowel Sounds present, soft, nontender, nondistended, no guarding, no rebound, no mass.  Extremities: No peripheral edema  Vascular: 2+ peripheral pulses  Neurological: A/O x , no focal deficits  Musculoskeletal: 5/5 strength b/l upper and lower extremities  Skin:  no visible rashes.                         7.1    7.91  )-----------( 163      ( 28 Jul 2021 05:57 )             24.0       07-28    136  |  104  |  71<H>  ----------------------------<  95  4.6   |  25  |  2.66<H>    Ca    6.7<L>      28 Jul 2021 05:57  Phos  5.9     07-28  Mg     1.9     07-28    TPro  7.2  /  Alb  x   /  TBili  x   /  DBili  x   /  AST  x   /  ALT  x   /  AlkPhos  x   07-27

## 2021-07-28 NOTE — PROGRESS NOTE ADULT - SUBJECTIVE AND OBJECTIVE BOX
NEPHROLOGY INTERVAL HPI/OVERNIGHT EVENTS:    Date of Service: 07-28-21 @ 14:41    7/28--Feeling better.  No new complaints.  UO 780cc  7/27--Resting comfortably.  No distress.  UO 1 liter.  HPI:  55 y/o female, poor historian, with pmhx of DM on glipizide presents to ER c/o weakness and lethargy. Found to be hypoglycemic to 50 in field, received amp of D50 and improved to 84 by arrival to ER. States she has been having diarrhea for last few days. Denies any changes in urination, abdominal pain, shortness of breath, chest pain, headache, fevers, cough.    States she took normal dose of glipizide this morning and has not had decreased PO intake. Denies any substance abuse or etoh abuse. Very poorly kept with severe venous stasis dermatitis. was on chronic keflex and states it was helping but she stopped taking it.     Found to be in acute renal failure with ph of 7.09, Cr 2.9, K 6.5, serum CO2 of 12. received calcium, albuterol, and bicarb in ER. (25 Jul 2021 22:52)  Patient is a 56y old  Female who presents with a chief complaint of acute renal failure (26 Jul 2021 12:45)  ---------------------------------------------  above hx obtained from pt and sister at bedside   has not seen physician and only done telehealth since pandemic  no labs obtained  has been having LE lesions with erythema and weeping since feb  has been on keflex since march   no n/v/d  no nsaid use   admits to mild ckd hx with metformin intake PTA and unable to get ct with ivc  has hx of mild chf with no cardiac eval and states she was recommended to get NST vs cardiac cath   has been fearful to leave home and has not gotten covid vaccine   denies ingestion of any etoh substances   home meds include lasix, kcl supplements and losartan and metformin     PAST MEDICAL & SURGICAL HISTORY:  - dm dx 5 yo  - ckd   - htn   - hypothy  - mild chf no cardiac fu    MEDICATIONS  (STANDING):  ammonium lactate 12% Lotion 1 Application(s) Topical two times a day  budesonide  80 MICROgram(s)/formoterol 4.5 MICROgram(s) Inhaler 2 Puff(s) Inhalation two times a day  cefTRIAXone Injectable. 1000 milliGRAM(s) IV Push <User Schedule>  chlorhexidine 2% Cloths 1 Application(s) Topical <User Schedule>  dextrose 40% Gel 15 Gram(s) Oral once  dextrose 5%. 1000 milliLiter(s) (50 mL/Hr) IV Continuous <Continuous>  dextrose 5%. 1000 milliLiter(s) (100 mL/Hr) IV Continuous <Continuous>  dextrose 50% Injectable 25 Gram(s) IV Push once  dextrose 50% Injectable 12.5 Gram(s) IV Push once  dextrose 50% Injectable 25 Gram(s) IV Push once  doxycycline hyclate Capsule 100 milliGRAM(s) Oral every 12 hours  glucagon  Injectable 1 milliGRAM(s) IntraMuscular once  heparin   Injectable 5000 Unit(s) SubCutaneous every 8 hours  insulin lispro (ADMELOG) corrective regimen sliding scale   SubCutaneous Before meals and at bedtime  levothyroxine 25 MICROGram(s) Oral daily  methylPREDNISolone sodium succinate Injectable 20 milliGRAM(s) IV Push every 12 hours  metoprolol succinate ER 50 milliGRAM(s) Oral daily  nystatin Powder 1 Application(s) Topical two times a day  pantoprazole    Tablet 40 milliGRAM(s) Oral before breakfast    MEDICATIONS  (PRN):  ALBUTerol    90 MICROgram(s) HFA Inhaler 2 Puff(s) Inhalation every 6 hours PRN Shortness of Breath and/or Wheezing    Vital Signs Last 24 Hrs  T(C): 36.7 (28 Jul 2021 12:00), Max: 36.7 (27 Jul 2021 18:00)  T(F): 98 (28 Jul 2021 12:00), Max: 98 (27 Jul 2021 18:00)  HR: 84 (28 Jul 2021 12:00) (77 - 108)  BP: 113/60 (28 Jul 2021 12:00) (97/82 - 146/55)  BP(mean): 69 (28 Jul 2021 12:00) (59 - 86)  RR: 17 (28 Jul 2021 12:00) (11 - 23)  SpO2: 99% (28 Jul 2021 12:00) (93% - 100%)    07-27 @ 07:01  -  07-28 @ 07:00  --------------------------------------------------------  IN: 2712 mL / OUT: 785 mL / NET: 1927 mL    07-28 @ 07:01  -  07-28 @ 14:41  --------------------------------------------------------  IN: 707 mL / OUT: 425 mL / NET: 282 mL    PHYSICAL EXAM:  GENERAL: comfortable.  CHEST/LUNG: Clear to aus  HEART: S1S2 RRR  ABDOMEN: soft  EXTREMITIES: chronic venous stasis  SKIN:     LABS:                        7.1    7.91  )-----------( 163      ( 28 Jul 2021 05:57 )             24.0     07-28    136  |  104  |  71<H>  ----------------------------<  95  4.6   |  25  |  2.66<H>    Ca    6.7<L>      28 Jul 2021 05:57  Phos  5.9     07-28  Mg     1.9     07-28    TPro  7.2  /  Alb  x   /  TBili  x   /  DBili  x   /  AST  x   /  ALT  x   /  AlkPhos  x   07-27        Magnesium, Serum: 1.9 mg/dL (07-28 @ 05:57)  Phosphorus Level, Serum: 5.9 mg/dL (07-28 @ 05:57)          RADIOLOGY & ADDITIONAL TESTS:

## 2021-07-28 NOTE — PROGRESS NOTE ADULT - ASSESSMENT
57 y/o female, poor historian, with pmhx of DM on glipizide presents to ER c/o weakness and lethargy. Found to be hypoglycemic to 50 in field, received amp of D50 and improved to 84 by arrival to ER. States she has been having diarrhea for last few days. Denies any changes in urination, abdominal pain, shortness of breath, chest pain, headache, fevers, cough.States she took normal dose of glipizide this morning and has not had decreased PO intake. Very poorly kept with severe venous stasis dermatitis. was on chronic keflex and states it was helping but she stopped taking it. Found to be in acute renal failure with ph of 7.09, Cr 2.9, K 6.5, serum CO2 of 12. received calcium, albuterol, and bicarb in ER. Has chronic LE stasis dermatitis weeping wounds with foul smell, concern for cellulitis, given vanco/rocephin.    1. b/l LE chronic stasis dermatitis. venous stasis. superimposed cellulitis. NEIL  - slowly improving  - s/p vancomycin #3, on doxycycline 100mg BID #1  - on rocephin 1gm daily #4  - continue with abx coverage  - blood cx no growth  - monitor temps  - wound care, whirpool tx if able  - monitor temps  - tolerating abx well so far; no side effects noted  - reason for abx use and side effects reviewed with patient  - supportive care  - fu cbc    2. other issues - care per medicine

## 2021-07-29 ENCOUNTER — TRANSCRIPTION ENCOUNTER (OUTPATIENT)
Age: 56
End: 2021-07-29

## 2021-07-29 LAB
ANION GAP SERPL CALC-SCNC: 6 MMOL/L — SIGNIFICANT CHANGE UP (ref 5–17)
BUN SERPL-MCNC: 71 MG/DL — HIGH (ref 7–23)
CALCIUM SERPL-MCNC: 6.6 MG/DL — LOW (ref 8.5–10.1)
CHLORIDE SERPL-SCNC: 104 MMOL/L — SIGNIFICANT CHANGE UP (ref 96–108)
CO2 SERPL-SCNC: 25 MMOL/L — SIGNIFICANT CHANGE UP (ref 22–31)
CREAT SERPL-MCNC: 2.43 MG/DL — HIGH (ref 0.5–1.3)
GLUCOSE SERPL-MCNC: 195 MG/DL — HIGH (ref 70–99)
HCT VFR BLD CALC: 25.1 % — LOW (ref 34.5–45)
HGB BLD-MCNC: 7.4 G/DL — LOW (ref 11.5–15.5)
MAGNESIUM SERPL-MCNC: 2 MG/DL — SIGNIFICANT CHANGE UP (ref 1.6–2.6)
MCHC RBC-ENTMCNC: 19.2 PG — LOW (ref 27–34)
MCHC RBC-ENTMCNC: 29.5 GM/DL — LOW (ref 32–36)
MCV RBC AUTO: 65 FL — LOW (ref 80–100)
PHOSPHATE SERPL-MCNC: 4.3 MG/DL — SIGNIFICANT CHANGE UP (ref 2.5–4.5)
PLATELET # BLD AUTO: 158 K/UL — SIGNIFICANT CHANGE UP (ref 150–400)
POTASSIUM SERPL-MCNC: 5.5 MMOL/L — HIGH (ref 3.5–5.3)
POTASSIUM SERPL-SCNC: 5.5 MMOL/L — HIGH (ref 3.5–5.3)
RBC # BLD: 3.86 M/UL — SIGNIFICANT CHANGE UP (ref 3.8–5.2)
RBC # FLD: 18.8 % — HIGH (ref 10.3–14.5)
SODIUM SERPL-SCNC: 135 MMOL/L — SIGNIFICANT CHANGE UP (ref 135–145)
WBC # BLD: 6.36 K/UL — SIGNIFICANT CHANGE UP (ref 3.8–10.5)
WBC # FLD AUTO: 6.36 K/UL — SIGNIFICANT CHANGE UP (ref 3.8–10.5)

## 2021-07-29 PROCEDURE — 99232 SBSQ HOSP IP/OBS MODERATE 35: CPT

## 2021-07-29 RX ORDER — LACTOBACILLUS ACIDOPHILUS 100MM CELL
2 CAPSULE ORAL
Qty: 56 | Refills: 0
Start: 2021-07-29 | End: 2021-08-11

## 2021-07-29 RX ORDER — NYSTATIN CREAM 100000 [USP'U]/G
1 CREAM TOPICAL
Qty: 1 | Refills: 0
Start: 2021-07-29

## 2021-07-29 RX ORDER — METFORMIN HYDROCHLORIDE 850 MG/1
1 TABLET ORAL
Qty: 0 | Refills: 0 | DISCHARGE

## 2021-07-29 RX ORDER — LACTOBACILLUS ACIDOPHILUS 100MM CELL
1 CAPSULE ORAL
Refills: 0 | Status: DISCONTINUED | OUTPATIENT
Start: 2021-07-29 | End: 2021-07-31

## 2021-07-29 RX ORDER — BUDESONIDE AND FORMOTEROL FUMARATE DIHYDRATE 160; 4.5 UG/1; UG/1
2 AEROSOL RESPIRATORY (INHALATION)
Qty: 1 | Refills: 0
Start: 2021-07-29

## 2021-07-29 RX ORDER — SOD,AMMONIUM,POTASSIUM LACTATE
1 CREAM (GRAM) TOPICAL
Qty: 1 | Refills: 0
Start: 2021-07-29

## 2021-07-29 RX ORDER — POTASSIUM CHLORIDE 20 MEQ
1 PACKET (EA) ORAL
Qty: 0 | Refills: 0 | DISCHARGE

## 2021-07-29 RX ORDER — ALBUTEROL 90 UG/1
2 AEROSOL, METERED ORAL
Qty: 1 | Refills: 0
Start: 2021-07-29

## 2021-07-29 RX ORDER — FUROSEMIDE 40 MG
1 TABLET ORAL
Qty: 0 | Refills: 0 | DISCHARGE

## 2021-07-29 RX ADMIN — Medication 1 PATCH: at 11:20

## 2021-07-29 RX ADMIN — Medication 1 APPLICATION(S): at 21:48

## 2021-07-29 RX ADMIN — NYSTATIN CREAM 1 APPLICATION(S): 100000 CREAM TOPICAL at 15:34

## 2021-07-29 RX ADMIN — Medication 100 MILLIGRAM(S): at 11:19

## 2021-07-29 RX ADMIN — PANTOPRAZOLE SODIUM 40 MILLIGRAM(S): 20 TABLET, DELAYED RELEASE ORAL at 05:08

## 2021-07-29 RX ADMIN — Medication 25 MICROGRAM(S): at 05:08

## 2021-07-29 RX ADMIN — Medication 6: at 21:47

## 2021-07-29 RX ADMIN — Medication 6: at 18:00

## 2021-07-29 RX ADMIN — Medication 1 APPLICATION(S): at 15:33

## 2021-07-29 RX ADMIN — Medication 6: at 08:14

## 2021-07-29 RX ADMIN — Medication 1 TABLET(S): at 21:48

## 2021-07-29 RX ADMIN — ALBUTEROL 2 PUFF(S): 90 AEROSOL, METERED ORAL at 03:55

## 2021-07-29 RX ADMIN — NYSTATIN CREAM 1 APPLICATION(S): 100000 CREAM TOPICAL at 21:48

## 2021-07-29 RX ADMIN — Medication 20 MILLIGRAM(S): at 11:18

## 2021-07-29 RX ADMIN — HEPARIN SODIUM 5000 UNIT(S): 5000 INJECTION INTRAVENOUS; SUBCUTANEOUS at 05:08

## 2021-07-29 RX ADMIN — CHLORHEXIDINE GLUCONATE 1 APPLICATION(S): 213 SOLUTION TOPICAL at 15:33

## 2021-07-29 RX ADMIN — BUDESONIDE AND FORMOTEROL FUMARATE DIHYDRATE 2 PUFF(S): 160; 4.5 AEROSOL RESPIRATORY (INHALATION) at 08:28

## 2021-07-29 RX ADMIN — HEPARIN SODIUM 5000 UNIT(S): 5000 INJECTION INTRAVENOUS; SUBCUTANEOUS at 15:33

## 2021-07-29 RX ADMIN — Medication 1 PATCH: at 11:21

## 2021-07-29 RX ADMIN — Medication 100 MILLIGRAM(S): at 21:47

## 2021-07-29 RX ADMIN — HEPARIN SODIUM 5000 UNIT(S): 5000 INJECTION INTRAVENOUS; SUBCUTANEOUS at 21:47

## 2021-07-29 RX ADMIN — Medication 50 MILLIGRAM(S): at 11:19

## 2021-07-29 NOTE — PROGRESS NOTE ADULT - SUBJECTIVE AND OBJECTIVE BOX
NEPHROLOGY INTERVAL HPI/OVERNIGHT EVENTS:    Date of Service: 07-28-21 @ 14:41  7/29- feels well, states was getting K replacement as outpt w diuretics now K 5.5, on no K restricted diet  7/28--Feeling better.  No new complaints.  UO 780cc  7/27--Resting comfortably.  No distress.  UO 1 liter.  HPI:  55 y/o female, poor historian, with pmhx of DM on glipizide presents to ER c/o weakness and lethargy. Found to be hypoglycemic to 50 in field, received amp of D50 and improved to 84 by arrival to ER. States she has been having diarrhea for last few days. Denies any changes in urination, abdominal pain, shortness of breath, chest pain, headache, fevers, cough.    States she took normal dose of glipizide this morning and has not had decreased PO intake. Denies any substance abuse or etoh abuse. Very poorly kept with severe venous stasis dermatitis. was on chronic keflex and states it was helping but she stopped taking it.     Found to be in acute renal failure with ph of 7.09, Cr 2.9, K 6.5, serum CO2 of 12. received calcium, albuterol, and bicarb in ER. (25 Jul 2021 22:52)  Patient is a 56y old  Female who presents with a chief complaint of acute renal failure (26 Jul 2021 12:45)  ---------------------------------------------  above hx obtained from pt and sister at bedside   has not seen physician and only done telehealth since pandemic  no labs obtained  has been having LE lesions with erythema and weeping since feb  has been on keflex since march   no n/v/d  no nsaid use   admits to mild ckd hx with metformin intake PTA and unable to get ct with ivc  has hx of mild chf with no cardiac eval and states she was recommended to get NST vs cardiac cath   has been fearful to leave home and has not gotten covid vaccine   denies ingestion of any etoh substances   home meds include lasix, kcl supplements and losartan and metformin     PAST MEDICAL & SURGICAL HISTORY:  - dm dx 5 yo  - ckd   - htn   - hypothy  - mild chf no cardiac fu    MEDICATIONS  (STANDING):  ammonium lactate 12% Lotion 1 Application(s) Topical two times a day  budesonide  80 MICROgram(s)/formoterol 4.5 MICROgram(s) Inhaler 2 Puff(s) Inhalation two times a day  cefTRIAXone Injectable. 1000 milliGRAM(s) IV Push <User Schedule>  chlorhexidine 2% Cloths 1 Application(s) Topical <User Schedule>  dextrose 40% Gel 15 Gram(s) Oral once  dextrose 5%. 1000 milliLiter(s) (50 mL/Hr) IV Continuous <Continuous>  dextrose 5%. 1000 milliLiter(s) (100 mL/Hr) IV Continuous <Continuous>  dextrose 50% Injectable 25 Gram(s) IV Push once  dextrose 50% Injectable 12.5 Gram(s) IV Push once  dextrose 50% Injectable 25 Gram(s) IV Push once  doxycycline hyclate Capsule 100 milliGRAM(s) Oral every 12 hours  glucagon  Injectable 1 milliGRAM(s) IntraMuscular once  heparin   Injectable 5000 Unit(s) SubCutaneous every 8 hours  insulin lispro (ADMELOG) corrective regimen sliding scale   SubCutaneous Before meals and at bedtime  levothyroxine 25 MICROGram(s) Oral daily  methylPREDNISolone sodium succinate Injectable 20 milliGRAM(s) IV Push every 12 hours  metoprolol succinate ER 50 milliGRAM(s) Oral daily  nicotine - 21 mG/24Hr(s) Patch 1 patch Transdermal daily  nystatin Powder 1 Application(s) Topical two times a day  pantoprazole    Tablet 40 milliGRAM(s) Oral before breakfast    MEDICATIONS  (PRN):  ALBUTerol    90 MICROgram(s) HFA Inhaler 2 Puff(s) Inhalation every 6 hours PRN Shortness of Breath and/or Wheezing      Vital Signs Last 24 Hrs  T(C): 36.7 (29 Jul 2021 08:37), Max: 37 (29 Jul 2021 00:00)  T(F): 98.1 (29 Jul 2021 08:37), Max: 98.6 (29 Jul 2021 00:00)  HR: 99 (29 Jul 2021 11:17) (76 - 99)  BP: 153/67 (29 Jul 2021 11:17) (138/61 - 153/67)  BP(mean): --  RR: 18 (29 Jul 2021 08:37) (18 - 18)  SpO2: 94% (29 Jul 2021 08:37) (93% - 94%)----  IN: 707 mL / OUT: 425 mL / NET: 282 mL    I&O's Detail    28 Jul 2021 07:01  -  29 Jul 2021 07:00  --------------------------------------------------------  IN:    Oral Fluid: 360 mL    Sodium Bicarbonate: 347 mL  Total IN: 707 mL    OUT:    Indwelling Catheter - Urethral (mL): 425 mL  Total OUT: 425 mL    Total NET: 282 mL          PHYSICAL EXAM:  GENERAL: comfortable.  CHEST/LUNG: Clear to aus  HEART: S1S2 RRR  ABDOMEN: soft  EXTREMITIES: chronic venous stasis, improved swellinh  SKIN:     LABS:                          7.4    6.36  )-----------( 158      ( 29 Jul 2021 08:11 )             25.1                           7.1    7.91  )-----------( 163      ( 28 Jul 2021 05:57 )             24.0     07-29    135  |  104  |  71<H>  ----------------------------<  195<H>  5.5<H>   |  25  |  2.43<H>    Ca    6.6<L>      29 Jul 2021 08:11  Phos  4.3     07-29  Mg     2.0     07-29 07-28    136  |  104  |  71<H>  ----------------------------<  95  4.6   |  25  |  2.66<H>    Ca    6.7<L>      28 Jul 2021 05:57  Phos  5.9     07-28  Mg     1.9     07-28    TPro  7.2  /  Alb  x   /  TBili  x   /  DBili  x   /  AST  x   /  ALT  x   /  AlkPhos  x   07-27        Magnesium, Serum: 1.9 mg/dL (07-28 @ 05:57)  Phosphorus Level, Serum: 5.9 mg/dL (07-28 @ 05:57)          RADIOLOGY & ADDITIONAL TESTS:

## 2021-07-29 NOTE — DISCHARGE NOTE PROVIDER - CARE PROVIDER_API CALL
Robin Doyle  NEPHROLOGY  [address], [address]  [place] [address]  Phone: [phone]  Fax: [phone]  Follow Up Time: 1-3 days

## 2021-07-29 NOTE — PROGRESS NOTE ADULT - ASSESSMENT
55 y/o female, poor historian, with pmhx of DM on glipizide presents to ER c/o weakness and lethargy. Found to be hypoglycemic to 50 in field, received amp of D50 and improved to 84 by arrival to ER. States she has been having diarrhea for last few days. Denies any changes in urination, abdominal pain, shortness of breath, chest pain, headache, fevers, cough.States she took normal dose of glipizide this morning and has not had decreased PO intake. Very poorly kept with severe venous stasis dermatitis. was on chronic keflex and states it was helping but she stopped taking it. Found to be in acute renal failure with ph of 7.09, Cr 2.9, K 6.5, serum CO2 of 12. received calcium, albuterol, and bicarb in ER. Has chronic LE stasis dermatitis weeping wounds with foul smell, concern for cellulitis, given vanco/rocephin.     b/l LE cellulitis upon chronic stasis dermatitis.  - slowly improving.  - BCx:  no growth.  - US:  no DVT.  - DC ceftriaxone and vancomycin.  - start doxycycline 100mg po bid x 7 more days.  - local wound care.  - ID    tardus parvus waveforms L DP artery.  - arterial US:  possible hemodynamically significant stenosis or occlusion in the anterior tibial artery.  - VSx consult.    NEIL + AGMA + hyperkalemia.  - Cr 2.43, modest improvement (07/25 2.98).  - DC'd  metformin, losartan and supplemental KCl.  - DC'd Tristan.  - s/p Kayexalate and HCO3 gtt.  - Nephrology f/u outpatient.    normocytic anemia.  - Hbg 7.4g/dL, stable.  - SPEP/JAMIE:  pending.  - Nephrology f/u outpatient. 57 y/o female, poor historian, with pmhx of DM on glipizide presents to ER c/o weakness and lethargy. Found to be hypoglycemic to 50 in field, received amp of D50 and improved to 84 by arrival to ER. States she has been having diarrhea for last few days. Denies any changes in urination, abdominal pain, shortness of breath, chest pain, headache, fevers, cough.States she took normal dose of glipizide this morning and has not had decreased PO intake. Very poorly kept with severe venous stasis dermatitis. was on chronic keflex and states it was helping but she stopped taking it. Found to be in acute renal failure with ph of 7.09, Cr 2.9, K 6.5, serum CO2 of 12. received calcium, albuterol, and bicarb in ER. Has chronic LE stasis dermatitis weeping wounds with foul smell, concern for cellulitis, given vanco/rocephin.     b/l LE cellulitis upon chronic stasis dermatitis.  - slowly improving.  - BCx:  no growth.  - US:  no DVT.  - DC ceftriaxone and vancomycin.  - start doxycycline 100mg po bid x 7 more days.  - local wound care.  - ID    tardus parvus waveforms L DP artery.  - arterial US:  possible hemodynamically significant stenosis or occlusion in the anterior tibial artery.  - no intervention planned.  - VSx input noted.    NEIL + AGMA + hyperkalemia.  - Cr 2.43, modest improvement (07/25 2.98).  - DC'd  metformin, losartan and supplemental KCl.  - DC'd Tristan.  - s/p Kayexalate and HCO3 gtt.  - Nephrology f/u outpatient.    normocytic anemia.  - Hbg 7.4g/dL, stable.  - SPEP/JAMIE:  pending.  - Nephrology f/u outpatient.    DVT prophylaxis.  - UFH sq.    disposition.  - 2S.  - plan to [place] home today w/ home care.    communication.  - 2S RN.  - CM.  - ID.  - Nephrology. 57 y/o female, poor historian, with pmhx of DM on glipizide presents to ER c/o weakness and lethargy. Found to be hypoglycemic to 50 in field, received amp of D50 and improved to 84 by arrival to ER. States she has been having diarrhea for last few days. Denies any changes in urination, abdominal pain, shortness of breath, chest pain, headache, fevers, cough.States she took normal dose of glipizide this morning and has not had decreased PO intake. Very poorly kept with severe venous stasis dermatitis. was on chronic keflex and states it was helping but she stopped taking it. Found to be in acute renal failure with ph of 7.09, Cr 2.9, K 6.5, serum CO2 of 12. received calcium, albuterol, and bicarb in ER. Has chronic LE stasis dermatitis weeping wounds with foul smell, concern for cellulitis, given vanco/rocephin.     b/l LE cellulitis upon chronic stasis dermatitis.  - slowly improving.  - BCx:  no growth.  - US:  no DVT.  - DC ceftriaxone and vancomycin.  - start:  doxycycline 100mg po bid x 7 more days.  - local wound care.  - ID    NEIL + AGMA + hyperkalemia.  - Cr 2.43, modest improvement (07/25 2.98).  - DC'd  metformin, losartan and supplemental KCl.  - DC'd Tristan.  - s/p Kayexalate and HCO3 gtt.  - Nephrology f/u outpatient.    acute COPD exacerbation.  - document room air O2 at rest and upon ambulation.  - DC SoluMedrol.  - start:  prednisone 40mg po qd x 4 more days, then DC.  - SHANNON MDI.  - ICS/LABA.  - Pulmonology f/u outpatient.    normocytic anemia.  - suspect AOCD.  - stool OB:  negative.  - Hbg 7.4g/dL, stable.  - SPEP/JAMIE:  pending.  - Nephrology f/u outpatient.    tardus parvus waveforms L DP artery.  - arterial US:  possible hemodynamically significant stenosis or occlusion in the anterior tibial artery.  - no intervention planned.  - VSx input noted.    hx DM2.  - A1C:  4.7%.  - FS target 140-180mg/dL.  - consisent CHO diet.  - DC metformin.  - resume glipizide.  - PMD f/u outpatient.    DVT prophylaxis.  - UFH sq.    disposition.  - 2S.  - plan to [place] home today w/ home care.    communication.  - 2S RN.  - CM.  - ID.  - Nephrology.

## 2021-07-29 NOTE — DISCHARGE NOTE PROVIDER - NSDCMRMEDTOKEN_GEN_ALL_CORE_FT
albuterol 90 mcg/inh inhalation aerosol: 2 puff(s) inhaled every 6 hours, As needed, Shortness of Breath and/or Wheezing  ammonium lactate 12% topical lotion: 1 application topically 2 times a day  budesonide-formoterol 80 mcg-4.5 mcg/inh inhalation aerosol: 2 puff(s) inhaled 2 times a day   buPROPion 150 mg/24 hours (XL) oral tablet, extended release: 1 tab(s) orally every 24 hours    **Pt said she is on bupropion for smoking cessation but it doesnt help**  Centrum Silver oral tablet: 1 tab(s) orally once a day  doxycycline monohydrate 100 mg oral capsule: 1 cap(s) orally every 12 hours  Fish Oil 1000 mg oral capsule: 1 cap(s) orally 2 times a day  glipiZIDE 5 mg oral tablet: 1 tab(s) orally once a day  levothyroxine 25 mcg (0.025 mg) oral tablet: 1 tab(s) orally once a day  metoprolol succinate 100 mg oral tablet, extended release: 1 tab(s) orally once a day  nystatin 100,000 units/g topical powder: 1 application topically 2 times a day  pantoprazole 20 mg oral delayed release tablet: 1 tab(s) orally once a day  predniSONE 20 mg oral tablet: 2 tab(s) orally once a day   Vitamin C 1000 mg oral tablet: 1 tab(s) orally once a day

## 2021-07-29 NOTE — DISCHARGE NOTE PROVIDER - HOSPITAL COURSE
57 y/o female, poor historian, with pmhx of DM on glipizide presents to ER c/o weakness and lethargy. Found to be hypoglycemic to 50 in field, received amp of D50 and improved to 84 by arrival to ER. States she has been having diarrhea for last few days. Denies any changes in urination, abdominal pain, shortness of breath, chest pain, headache, fevers, cough.States she took normal dose of glipizide this morning and has not had decreased PO intake. Very poorly kept with severe venous stasis dermatitis. was on chronic keflex and states it was helping but she stopped taking it. Found to be in acute renal failure with ph of 7.09, Cr 2.9, K 6.5, serum CO2 of 12. received calcium, albuterol, and bicarb in ER. Has chronic LE stasis dermatitis weeping wounds with foul smell, concern for cellulitis, given vanco/rocephin.     b/l LE cellulitis upon chronic stasis dermatitis.  - slowly improving.  - BCx:  no growth.  - US:  no DVT.  - DC ceftriaxone and vancomycin.  - start doxycycline 100mg po bid x 7 more days.  - local wound care.  - ID    tardus parvus waveforms L DP artery.  - arterial US:  possible hemodynamically significant stenosis or occlusion in the anterior tibial artery.  - no intervention planned.  - VSx input noted.    NEIL + AGMA + hyperkalemia.  - Cr 2.43, modest improvement (07/25 2.98).  - DC'd  metformin, losartan and supplemental KCl.  - DC'd Tristan.  - s/p Kayexalate and HCO3 gtt.  - Nephrology f/u outpatient.    normocytic anemia.  - Hbg 7.4g/dL, stable.  - SPEP/JAMIE:  pending.  - Nephrology f/u outpatient.    DVT prophylaxis.  - UFH sq.    disposition.  - 2S.  - plan to DC home today w/ home care.    communication.  - 2S RN.  - CM.  - ID.  - Nephrology. 55 y/o female, poor historian, with pmhx of DM on glipizide presents to ER c/o weakness and lethargy. Found to be hypoglycemic to 50 in field, received amp of D50 and improved to 84 by arrival to ER. States she has been having diarrhea for last few days. Denies any changes in urination, abdominal pain, shortness of breath, chest pain, headache, fevers, cough.States she took normal dose of glipizide this morning and has not had decreased PO intake. Very poorly kept with severe venous stasis dermatitis. was on chronic keflex and states it was helping but she stopped taking it. Found to be in acute renal failure with ph of 7.09, Cr 2.9, K 6.5, serum CO2 of 12. received calcium, albuterol, and bicarb in ER. Has chronic LE stasis dermatitis weeping wounds with foul smell, concern for cellulitis, given vanco/rocephin.     b/l LE cellulitis upon chronic stasis dermatitis.  - slowly improving.  - BCx:  no growth.  - US:  no DVT.  - DC ceftriaxone and vancomycin.  - start:  doxycycline 100mg po bid x 7 more days.  - local wound care.  - ID    NEIL + AGMA + hyperkalemia.  - Cr 2.43, modest improvement (07/25 2.98).  - DC'd  metformin, losartan and supplemental KCl.  - DC'd Tristan.  - s/p Kayexalate and HCO3 gtt.  - Nephrology f/u outpatient.    acute COPD exacerbation.  - document room air O2 at rest and upon ambulation.  - DC SoluMedrol.  - start:  prednisone 40mg po qd x 4 more days, then DC.  - SHANNON MDI.  - ICS/LABA.  - Pulmonology f/u outpatient.    normocytic anemia.  - suspect AOCD.  - stool OB:  negative.  - Hbg 7.4g/dL, stable.  - SPEP/JAMIE:  pending.  - Nephrology f/u outpatient.    tardus parvus waveforms L DP artery.  - arterial US:  possible hemodynamically significant stenosis or occlusion in the anterior tibial artery.  - no intervention planned.  - VSx input noted.    hx DM2.  - A1C:  4.7%.  - FS target 140-180mg/dL.  - consisent CHO diet.  - DC metformin.  - resume glipizide.  - PMD f/u outpatient.    DVT prophylaxis.  - UFH sq.    disposition.  - 2S.  - plan to [place] home today w/ home care.    communication.  - 2S RN.  - CM.  - ID.  - Nephrology.

## 2021-07-29 NOTE — DISCHARGE NOTE PROVIDER - NSDCCPCAREPLAN_GEN_ALL_CORE_FT
PRINCIPAL DISCHARGE DIAGNOSIS  Diagnosis: Cellulitis of lower extremity, unspecified laterality  Assessment and Plan of Treatment:       SECONDARY DISCHARGE DIAGNOSES  Diagnosis: Hypoglycemia  Assessment and Plan of Treatment:     Diagnosis: Hyperkalemia  Assessment and Plan of Treatment:     Diagnosis: NEIL (acute kidney injury)  Assessment and Plan of Treatment:

## 2021-07-29 NOTE — DISCHARGE NOTE PROVIDER - NSDCDCMDCOMP_GEN_ALL_CORE
Speech Therapy Hospital Course     3/8/21: d/c home, repeat video swallow study recommended as outpatient  3/7/21 advance to nissen soft, continue thin liquids with chin tuck, no straws  3/6/21 Full liquids  3/5/21 video swallow study. Rec Clear liquids- thin consistency with chin tuck. No straw  3/4/21: Clinical: education completed, exercises introduced  3/3/21: Robotic transhiatal esophagectomy with transcervical endoscopic esophageal mobilization and botox pyloroplasty. Intubated and extubated. Strict NPO     Prior Speech Therapy:   2/11/21: Pre-operative Clinical Swallowing Evaluation. Clinical swallow evaluation revealed intact oropharyngeal swallow function. Informal voice evaluation revealed vocal parameters of pitch, resonance and loudness to be within normal limits.      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

## 2021-07-29 NOTE — PHYSICAL THERAPY INITIAL EVALUATION ADULT - PERTINENT HX OF CURRENT PROBLEM, REHAB EVAL
Pt admitted to  secondary to weakness, lethargy, bilateral LE edema/discoloration, and blood glucose 50 in the field. Pt given amp of D50 and glucose increased to 84 by arrival to ED. Bilateral LE venous dopplers: neg. Bilateral arterial duplex: possible stenosis or occlusion in the bilateral ant tibial arteries. COVID 19: neg.

## 2021-07-29 NOTE — PHYSICAL THERAPY INITIAL EVALUATION ADULT - GENERAL OBSERVATIONS, REHAB EVAL
Pt found sitting OOB in chair. Noted min-mod edema bilateral LE's and discoloration/scabbing bilateral LE's.

## 2021-07-29 NOTE — PHYSICAL THERAPY INITIAL EVALUATION ADULT - DIAGNOSIS, PT EVAL
Bilateral LE cellulitis, sepsis, NEIL with hyperkalemia, acidosis, hypoglycemia, tests results as above.

## 2021-07-29 NOTE — PROGRESS NOTE ADULT - SUBJECTIVE AND OBJECTIVE BOX
CC:  Patient is a 56y old  Female who presents with a chief complaint of acute renal failure (29 Jul 2021 13:58)    SUBJECTIVE:     -no new complaints or issues at current time.    ROS:  all other review of systems are negative unless indicated above.    ALBUTerol    90 MICROgram(s) HFA Inhaler 2 Puff(s) Inhalation every 6 hours PRN  ammonium lactate 12% Lotion 1 Application(s) Topical two times a day  budesonide  80 MICROgram(s)/formoterol 4.5 MICROgram(s) Inhaler 2 Puff(s) Inhalation two times a day  cefTRIAXone Injectable. 1000 milliGRAM(s) IV Push <User Schedule>  doxycycline hyclate Capsule 100 milliGRAM(s) Oral every 12 hours  glucagon  Injectable 1 milliGRAM(s) IntraMuscular once  heparin   Injectable 5000 Unit(s) SubCutaneous every 8 hours  insulin lispro (ADMELOG) corrective regimen sliding scale   SubCutaneous Before meals and at bedtime  levothyroxine 25 MICROGram(s) Oral daily  methylPREDNISolone sodium succinate Injectable 20 milliGRAM(s) IV Push every 12 hours  metoprolol succinate ER 50 milliGRAM(s) Oral daily  nicotine - 21 mG/24Hr(s) Patch 1 patch Transdermal daily  nystatin Powder 1 Application(s) Topical two times a day  pantoprazole    Tablet 40 milliGRAM(s) Oral before breakfast    T(C): 36.7 (07-29-21 @ 08:37), Max: 37 (07-29-21 @ 00:00)  HR: 99 (07-29-21 @ 11:17) (76 - 99)  BP: 153/67 (07-29-21 @ 11:17) (138/61 - 153/67)  RR: 18 (07-29-21 @ 08:37) (18 - 18)  SpO2: 94% (07-29-21 @ 08:37) (93% - 94%)    Constitutional: NAD.   HEENT: PERRL, EOMI, MMM.  Neck: Soft and supple, No carotid bruit, No JVD  Respiratory: Breath sounds are clear bilaterally, No wheezing, rales or rhonchi  Cardiovascular: S1 and S2, regular rate and rhythm, no murmur, rub or gallop.  Gastrointestinal: Bowel Sounds present, soft, nontender, nondistended, no guarding, no rebound, no mass.  Extremities: No peripheral edema  Vascular: 2+ peripheral pulses  Neurological: A/O x , no focal deficits  Musculoskeletal: 5/5 strength b/l upper and lower extremities  Skin:  no visible rashes.                         7.4    6.36  )-----------( 158      ( 29 Jul 2021 08:11 )             25.1       07-29    135  |  104  |  71<H>  ----------------------------<  195<H>  5.5<H>   |  25  |  2.43<H>    Ca    6.6<L>      29 Jul 2021 08:11  Phos  4.3     07-29  Mg     2.0     07-29    Culture - Urine (07.25.21 @ 22:31)   Specimen Source: .Urine Clean Catch (Midstream)   Culture Results:   No growth   Culture - Blood (07.25.21 @ 22:02)   Specimen Source: .Blood None   Culture Results:   No growth to date.   Culture - Blood (07.25.21 @ 21:05)   Specimen Source: .Blood Blood-Peripheral   Culture Results:   No growth to date.     < from: US Duplex Arterial Lower Ext Compl, Bilateral (07.26.21 @ 14:04) >  IMPRESSION: Both anterior tibial arteries are not visualized. Tardus parvus waveforms in the left and dorsalis pedis artery, possibly hemodynamically significant stenosis or occlusion in the anterior tibial arter    < end of copied text >    < end of copied text >  < from: US Duplex Venous Lower Ext Complete, Bilateral (07.26.21 @ 13:58) >    IMPRESSION:  No evidence of deep venous thrombosis in either lower extremity.    < end of copied text >  < from: CT Abdomen and Pelvis No Cont (07.25.21 @ 23:52) >  IMPRESSION:    Cardiomegaly and trace pericardial fluid.    Cirrhosis and splenomegaly.    Stable left adnexal mass.    Mildly prominent bilateral pelvic obturator and external iliac lymph nodes are unchanged.    < end of copied text >  < from: Xray Chest 1 View-PORTABLE IMMEDIATE (Xray Chest 1 View-PORTABLE IMMEDIATE .) (07.25.21 @ 21:56) >  IMPRESSION: Heart enlargement. Possible congenital or valvular heart disease. Mild central CHF.    < end of copied text >    `< from: 12 Lead ECG (07.26.21 @ 07:41) >  Diagnosis Line Normal sinus rhythm  Low voltage QRS  Borderline ECG  No previous ECGs available

## 2021-07-29 NOTE — PROGRESS NOTE ADULT - ASSESSMENT
55 y/o female, poor historian, with pmhx of DM on glipizide presents to ER c/o weakness and lethargy.    noted with NEIL with AG metabolic acidosis in setting of low lactate level in setting of metformin use PTA   sepsis due to cellullitis with chronic venous stasis   hyperkalemia with neil in setting of losartan and kcl intake     PLAN   - agree with bicarbonate drip, inc rate limited by wheezing   - kayexelate given and will start lokelma bid and fu trend of k   - Inc Uop noted and will fu trend of the scr  - obtain baseline labs from pmd,, office contacted   - metformin, kcl and losartan on hold   - on vanc daily, fu levels closely   - check lactate     addenum   labs from pmd 9/2019 scr 0.8    7/27 SY  --NEIL : Creat slightly improved with increased uo.   Continue IVF /HCO3 for now.   Continue Tristan for another day to monitor UO.  --Hyperkalemia : resolved.  No further tx.   Continue to hold ARB.  --Anemia of unclear etiology--send work up.    7/28 SY  --NEIL : Renal parameters holding fairly steady. Now off IVF and monitor    D/c Tristan  --Electrolytes improved and stable.  --Anemia : follow up SPEP/JAMIE,    7/29  Creat cont to improve  repeat K level in am  d/w Pt to avoid K concentrated foods  zeinab gordillo  spep pending

## 2021-07-29 NOTE — DISCHARGE NOTE PROVIDER - YES NO FOR MLM POSITIVE OR NEGATIVE COVID RESULT
Discharge instructions given. Pt verbalized understanding with no questions or concerns. Pt taken out by wheel chair. Miriam Fairbanks RN ,

## 2021-07-30 ENCOUNTER — TRANSCRIPTION ENCOUNTER (OUTPATIENT)
Age: 56
End: 2021-07-30

## 2021-07-30 LAB
24R-OH-CALCIDIOL SERPL-MCNC: 15.7 NG/ML — LOW (ref 30–80)
ALBUMIN SERPL ELPH-MCNC: 2.8 G/DL — LOW (ref 3.3–5)
ANION GAP SERPL CALC-SCNC: 5 MMOL/L — SIGNIFICANT CHANGE UP (ref 5–17)
BUN SERPL-MCNC: 66 MG/DL — HIGH (ref 7–23)
CALCIUM SERPL-MCNC: 6.9 MG/DL — LOW (ref 8.5–10.1)
CALCIUM SERPL-MCNC: 7.2 MG/DL — LOW (ref 8.4–10.5)
CHLORIDE SERPL-SCNC: 105 MMOL/L — SIGNIFICANT CHANGE UP (ref 96–108)
CO2 SERPL-SCNC: 25 MMOL/L — SIGNIFICANT CHANGE UP (ref 22–31)
CREAT SERPL-MCNC: 2.28 MG/DL — HIGH (ref 0.5–1.3)
GLUCOSE SERPL-MCNC: 193 MG/DL — HIGH (ref 70–99)
PHOSPHATE SERPL-MCNC: 3.8 MG/DL — SIGNIFICANT CHANGE UP (ref 2.5–4.5)
POTASSIUM SERPL-MCNC: 5.6 MMOL/L — HIGH (ref 3.5–5.3)
POTASSIUM SERPL-SCNC: 5.6 MMOL/L — HIGH (ref 3.5–5.3)
PTH-INTACT FLD-MCNC: 148 PG/ML — HIGH (ref 15–65)
SODIUM SERPL-SCNC: 135 MMOL/L — SIGNIFICANT CHANGE UP (ref 135–145)

## 2021-07-30 PROCEDURE — 99232 SBSQ HOSP IP/OBS MODERATE 35: CPT

## 2021-07-30 RX ORDER — SODIUM ZIRCONIUM CYCLOSILICATE 10 G/10G
5 POWDER, FOR SUSPENSION ORAL ONCE
Refills: 0 | Status: COMPLETED | OUTPATIENT
Start: 2021-07-30 | End: 2021-07-30

## 2021-07-30 RX ORDER — ALPRAZOLAM 0.25 MG
0.25 TABLET ORAL ONCE
Refills: 0 | Status: DISCONTINUED | OUTPATIENT
Start: 2021-07-30 | End: 2021-07-31

## 2021-07-30 RX ORDER — SODIUM ZIRCONIUM CYCLOSILICATE 10 G/10G
5 POWDER, FOR SUSPENSION ORAL
Qty: 15 | Refills: 0
Start: 2021-07-30

## 2021-07-30 RX ADMIN — BUDESONIDE AND FORMOTEROL FUMARATE DIHYDRATE 2 PUFF(S): 160; 4.5 AEROSOL RESPIRATORY (INHALATION) at 21:14

## 2021-07-30 RX ADMIN — Medication 1 TABLET(S): at 22:38

## 2021-07-30 RX ADMIN — Medication 100 MILLIGRAM(S): at 09:18

## 2021-07-30 RX ADMIN — Medication 50 MILLIGRAM(S): at 09:18

## 2021-07-30 RX ADMIN — ALBUTEROL 2 PUFF(S): 90 AEROSOL, METERED ORAL at 08:15

## 2021-07-30 RX ADMIN — Medication 1 APPLICATION(S): at 09:20

## 2021-07-30 RX ADMIN — Medication 1 TABLET(S): at 09:18

## 2021-07-30 RX ADMIN — Medication 3: at 07:37

## 2021-07-30 RX ADMIN — Medication 1 PATCH: at 09:20

## 2021-07-30 RX ADMIN — Medication 3: at 22:46

## 2021-07-30 RX ADMIN — Medication 1 PATCH: at 19:00

## 2021-07-30 RX ADMIN — ALBUTEROL 2 PUFF(S): 90 AEROSOL, METERED ORAL at 21:14

## 2021-07-30 RX ADMIN — NYSTATIN CREAM 1 APPLICATION(S): 100000 CREAM TOPICAL at 09:19

## 2021-07-30 RX ADMIN — HEPARIN SODIUM 5000 UNIT(S): 5000 INJECTION INTRAVENOUS; SUBCUTANEOUS at 22:38

## 2021-07-30 RX ADMIN — Medication 1 PATCH: at 05:45

## 2021-07-30 RX ADMIN — Medication 40 MILLIGRAM(S): at 05:44

## 2021-07-30 RX ADMIN — SODIUM ZIRCONIUM CYCLOSILICATE 5 GRAM(S): 10 POWDER, FOR SUSPENSION ORAL at 18:48

## 2021-07-30 RX ADMIN — SODIUM ZIRCONIUM CYCLOSILICATE 5 GRAM(S): 10 POWDER, FOR SUSPENSION ORAL at 13:42

## 2021-07-30 RX ADMIN — PANTOPRAZOLE SODIUM 40 MILLIGRAM(S): 20 TABLET, DELAYED RELEASE ORAL at 05:45

## 2021-07-30 RX ADMIN — BUDESONIDE AND FORMOTEROL FUMARATE DIHYDRATE 2 PUFF(S): 160; 4.5 AEROSOL RESPIRATORY (INHALATION) at 08:15

## 2021-07-30 RX ADMIN — Medication 1 APPLICATION(S): at 22:38

## 2021-07-30 RX ADMIN — HEPARIN SODIUM 5000 UNIT(S): 5000 INJECTION INTRAVENOUS; SUBCUTANEOUS at 05:45

## 2021-07-30 RX ADMIN — Medication 1 PATCH: at 09:19

## 2021-07-30 RX ADMIN — NYSTATIN CREAM 1 APPLICATION(S): 100000 CREAM TOPICAL at 22:47

## 2021-07-30 RX ADMIN — Medication 100 MILLIGRAM(S): at 22:38

## 2021-07-30 RX ADMIN — Medication 6: at 12:35

## 2021-07-30 RX ADMIN — Medication 25 MICROGRAM(S): at 05:44

## 2021-07-30 RX ADMIN — HEPARIN SODIUM 5000 UNIT(S): 5000 INJECTION INTRAVENOUS; SUBCUTANEOUS at 13:42

## 2021-07-30 RX ADMIN — CHLORHEXIDINE GLUCONATE 1 APPLICATION(S): 213 SOLUTION TOPICAL at 05:44

## 2021-07-30 NOTE — PROGRESS NOTE ADULT - ASSESSMENT
55 y/o female, poor historian, with pmhx of DM on glipizide presents to ER c/o weakness and lethargy. Found to be hypoglycemic to 50 in field, received amp of D50 and improved to 84 by arrival to ER. States she has been having diarrhea for last few days. Denies any changes in urination, abdominal pain, shortness of breath, chest pain, headache, fevers, cough.States she took normal dose of glipizide this morning and has not had decreased PO intake. Very poorly kept with severe venous stasis dermatitis. was on chronic keflex and states it was helping but she stopped taking it. Found to be in acute renal failure with ph of 7.09, Cr 2.9, K 6.5, serum CO2 of 12. received calcium, albuterol, and bicarb in ER. Has chronic LE stasis dermatitis weeping wounds with foul smell, concern for cellulitis, given vanco/rocephin.     b/l LE cellulitis upon chronic stasis dermatitis.  - slowly improving.  - BCx:  no growth.  - US:  no DVT.  - DC ceftriaxone and vancomycin.  - start:  doxycycline 100mg po bid x 7 more days.      NEIL + AGMA + hyperkalemia.  - Cr 2.43, modest improvement (07/25 2.98).  - DC'd  metformin, losartan and supplemental KCl.  - DC'd Tristan.  - s/p Kayexalate and HCO3 gtt.  - Nephrology f/u outpatient.  - Lokelma m/w/f x 1 week and needs close f/u with pcp for bmp repeat  - strict potassium restricted diet    acute COPD exacerbation.  - document room air O2 at rest and upon ambulation.  - DC SoluMedrol.  - start:  prednisone 40mg po qd x 4 more days, then DC.  - SHANNON MDI.  - ICS/LABA.  - Pulmonology f/u outpatient.    normocytic anemia.  - suspect AOCD.  - stool OB:  negative.  - Hbg 7.4g/dL, stable.  - SPEP/JAMIE:  pending.  - Nephrology f/u outpatient.    tardus parvus waveforms L DP artery.  - arterial US:  possible hemodynamically significant stenosis or occlusion in the anterior tibial artery.  - no intervention planned.  - VSx input noted.    hx DM2.  - A1C:  4.7%.  - FS target 140-180mg/dL.  - consisent CHO diet.  - DC metformin.  - resume glipizide.  - PMD f/u outpatient.    DVT prophylaxis.  - UFH sq.    disposition.  - 2S.  - plan to [place] home today w/ home care.    communication.  - 2S RN.  - CM.  - ID.  - Nephrology.

## 2021-07-30 NOTE — PROGRESS NOTE ADULT - SUBJECTIVE AND OBJECTIVE BOX
NEPHROLOGY INTERVAL HPI/OVERNIGHT EVENTS:  07-30-21 @ 10:58  HPI:  55 y/o female, poor historian, with pmhx of DM on glipizide presents to ER c/o weakness and lethargy. Found to be hypoglycemic to 50 in field, received amp of D50 and improved to 84 by arrival to ER. States she has been having diarrhea for last few days. Denies any changes in urination, abdominal pain, shortness of breath, chest pain, headache, fevers, cough.    States she took normal dose of glipizide this morning and has not had decreased PO intake. Denies any substance abuse or etoh abuse. Very poorly kept with severe venous stasis dermatitis. was on chronic keflex and states it was helping but she stopped taking it.     Found to be in acute renal failure with ph of 7.09, Cr 2.9, K 6.5, serum CO2 of 12. received calcium, albuterol, and bicarb in ER. (25 Jul 2021 22:52)      Patient is a 56y old  Female who presents with a chief complaint of acute renal failure (29 Jul 2021 15:16)      MEDICATIONS  (STANDING):  ammonium lactate 12% Lotion 1 Application(s) Topical two times a day  budesonide  80 MICROgram(s)/formoterol 4.5 MICROgram(s) Inhaler 2 Puff(s) Inhalation two times a day  chlorhexidine 2% Cloths 1 Application(s) Topical <User Schedule>  dextrose 40% Gel 15 Gram(s) Oral once  dextrose 5%. 1000 milliLiter(s) (50 mL/Hr) IV Continuous <Continuous>  dextrose 5%. 1000 milliLiter(s) (100 mL/Hr) IV Continuous <Continuous>  dextrose 50% Injectable 25 Gram(s) IV Push once  dextrose 50% Injectable 12.5 Gram(s) IV Push once  dextrose 50% Injectable 25 Gram(s) IV Push once  doxycycline hyclate Capsule 100 milliGRAM(s) Oral every 12 hours  glucagon  Injectable 1 milliGRAM(s) IntraMuscular once  heparin   Injectable 5000 Unit(s) SubCutaneous every 8 hours  insulin lispro (ADMELOG) corrective regimen sliding scale   SubCutaneous Before meals and at bedtime  lactobacillus acidophilus 1 Tablet(s) Oral two times a day  levothyroxine 25 MICROGram(s) Oral daily  metoprolol succinate ER 50 milliGRAM(s) Oral daily  nicotine - 21 mG/24Hr(s) Patch 1 patch Transdermal daily  nystatin Powder 1 Application(s) Topical two times a day  pantoprazole    Tablet 40 milliGRAM(s) Oral before breakfast  predniSONE   Tablet 40 milliGRAM(s) Oral daily    MEDICATIONS  (PRN):  ALBUTerol    90 MICROgram(s) HFA Inhaler 2 Puff(s) Inhalation every 6 hours PRN Shortness of Breath and/or Wheezing      Allergies    ampicillin (Unknown)    Intolerances        I&O's Detail    30 Jul 2021 07:01  -  30 Jul 2021 10:58  --------------------------------------------------------  IN:  Total IN: 0 mL    OUT:    Indwelling Catheter - Urethral (mL): 2 mL  Total OUT: 2 mL    Total NET: -2 mL          .    Patient was seen and evaluated on dialysis.   Patient is tolerating the procedure well.   Continue dialysis:   Dialyzer:          QB:        QD:   Goal UF ___ over ___ Hours       Vital Signs Last 24 Hrs  T(C): 36.8 (30 Jul 2021 08:57), Max: 36.8 (29 Jul 2021 15:11)  T(F): 98.2 (30 Jul 2021 08:57), Max: 98.2 (29 Jul 2021 15:11)  HR: 99 (30 Jul 2021 08:57) (86 - 99)  BP: 147/66 (30 Jul 2021 08:57) (121/49 - 153/67)  BP(mean): --  RR: 18 (30 Jul 2021 09:17) (18 - 20)  SpO2: 97% (30 Jul 2021 09:17) (90% - 98%)  Daily     Daily     PHYSICAL EXAM:  General: alert. awake Ox3  HEENT: MMM  CV: s1s2 rrr  LUNGS: B/L CTA  EXT: no edema    LABS:                        7.4    6.36  )-----------( 158      ( 29 Jul 2021 08:11 )             25.1     07-29    135  |  104  |  71<H>  ----------------------------<  195<H>  5.5<H>   |  25  |  2.43<H>    Ca    6.6<L>      29 Jul 2021 08:11  Phos  4.3     07-29  Mg     2.0     07-29               NEPHROLOGY INTERVAL HPI/OVERNIGHT EVENTS:  07-30-21 @ 10:58    7/30 tolerating po states is trying to be compliant with low k diet   7/29- feels well, states was getting K replacement as outpt w diuretics now K 5.5, on no K restricted diet  7/28--Feeling better.  No new complaints.  UO 780cc  7/27--Resting comfortably.  No distress.  UO 1 liter.  HPI:  55 y/o female, poor historian, with pmhx of DM on glipizide presents to ER c/o weakness and lethargy. Found to be hypoglycemic to 50 in field, received amp of D50 and improved to 84 by arrival to ER. States she has been having diarrhea for last few days. Denies any changes in urination, abdominal pain, shortness of breath, chest pain, headache, fevers, cough.    States she took normal dose of glipizide this morning and has not had decreased PO intake. Denies any substance abuse or etoh abuse. Very poorly kept with severe venous stasis dermatitis. was on chronic keflex and states it was helping but she stopped taking it.     Found to be in acute renal failure with ph of 7.09, Cr 2.9, K 6.5, serum CO2 of 12. received calcium, albuterol, and bicarb in ER. (25 Jul 2021 22:52)  Patient is a 56y old  Female who presents with a chief complaint of acute renal failure (26 Jul 2021 12:45)  ---------------------------------------------  above hx obtained from pt and sister at bedside   has not seen physician and only done telehealth since pandemic  no labs obtained  has been having LE lesions with erythema and weeping since feb  has been on keflex since march   no n/v/d  no nsaid use   admits to mild ckd hx with metformin intake PTA and unable to get ct with ivc  has hx of mild chf with no cardiac eval and states she was recommended to get NST vs cardiac cath   has been fearful to leave home and has not gotten covid vaccine   denies ingestion of any etoh substances   home meds include lasix, kcl supplements and losartan and metformin     PAST MEDICAL & SURGICAL HISTORY:  - dm dx 3 yo  - ckd   - htn   - hypothy  - mild chf no cardiac fu      MEDICATIONS  (STANDING):  ammonium lactate 12% Lotion 1 Application(s) Topical two times a day  budesonide  80 MICROgram(s)/formoterol 4.5 MICROgram(s) Inhaler 2 Puff(s) Inhalation two times a day  chlorhexidine 2% Cloths 1 Application(s) Topical <User Schedule>  dextrose 40% Gel 15 Gram(s) Oral once  dextrose 5%. 1000 milliLiter(s) (50 mL/Hr) IV Continuous <Continuous>  dextrose 5%. 1000 milliLiter(s) (100 mL/Hr) IV Continuous <Continuous>  dextrose 50% Injectable 25 Gram(s) IV Push once  dextrose 50% Injectable 12.5 Gram(s) IV Push once  dextrose 50% Injectable 25 Gram(s) IV Push once  doxycycline hyclate Capsule 100 milliGRAM(s) Oral every 12 hours  glucagon  Injectable 1 milliGRAM(s) IntraMuscular once  heparin   Injectable 5000 Unit(s) SubCutaneous every 8 hours  insulin lispro (ADMELOG) corrective regimen sliding scale   SubCutaneous Before meals and at bedtime  lactobacillus acidophilus 1 Tablet(s) Oral two times a day  levothyroxine 25 MICROGram(s) Oral daily  metoprolol succinate ER 50 milliGRAM(s) Oral daily  nicotine - 21 mG/24Hr(s) Patch 1 patch Transdermal daily  nystatin Powder 1 Application(s) Topical two times a day  pantoprazole    Tablet 40 milliGRAM(s) Oral before breakfast  predniSONE   Tablet 40 milliGRAM(s) Oral daily    MEDICATIONS  (PRN):  ALBUTerol    90 MICROgram(s) HFA Inhaler 2 Puff(s) Inhalation every 6 hours PRN Shortness of Breath and/or Wheezing      Allergies    ampicillin (Unknown)    Intolerances        I&O's Detail    30 Jul 2021 07:01  -  30 Jul 2021 10:58  --------------------------------------------------------  IN:  Total IN: 0 mL    OUT:    Indwelling Catheter - Urethral (mL): 2 mL  Total OUT: 2 mL    Total NET: -2 mL          Vital Signs Last 24 Hrs  T(C): 36.8 (30 Jul 2021 08:57), Max: 36.8 (29 Jul 2021 15:11)  T(F): 98.2 (30 Jul 2021 08:57), Max: 98.2 (29 Jul 2021 15:11)  HR: 99 (30 Jul 2021 08:57) (86 - 99)  BP: 147/66 (30 Jul 2021 08:57) (121/49 - 153/67)  BP(mean): --  RR: 18 (30 Jul 2021 09:17) (18 - 20)  SpO2: 97% (30 Jul 2021 09:17) (90% - 98%)  Daily     Daily     PHYSICAL EXAM:  General: alert. awake Ox3  HEENT: MMM  CV: s1s2 rrr  LUNGS: B/L CTA  EXT: + edema    LABS:                        7.4    6.36  )-----------( 158      ( 29 Jul 2021 08:11 )             25.1     07-29    135  |  104  |  71<H>  ----------------------------<  195<H>  5.5<H>   |  25  |  2.43<H>    Ca    6.6<L>      29 Jul 2021 08:11  Phos  4.3     07-29  Mg     2.0     07-29

## 2021-07-30 NOTE — DISCHARGE NOTE NURSING/CASE MANAGEMENT/SOCIAL WORK - PATIENT PORTAL LINK FT
You can access the FollowMyHealth Patient Portal offered by [facility] by registering at the following website: [email or web address]. By joining [employer]’s FollowMyHealth portal, you will also be able to view your health information using other applications (apps) compatible with our system.

## 2021-07-30 NOTE — PROGRESS NOTE ADULT - ASSESSMENT
57 y/o female, poor historian, with pmhx of DM on glipizide presents to ER c/o weakness and lethargy.    noted with NEIL with AG metabolic acidosis in setting of low lactate level in setting of metformin use PTA   sepsis due to cellullitis with chronic venous stasis   hyperkalemia with neil in setting of losartan and kcl intake     PLAN   - agree with bicarbonate drip, inc rate limited by wheezing   - kayexelate given and will start lokelma bid and fu trend of k   - Inc Uop noted and will fu trend of the scr  - obtain baseline labs from pmd,, office contacted   - metformin, kcl and losartan on hold   - on vanc daily, fu levels closely   - check lactate     addenum   labs from pmd 9/2019 scr 0.8    7/27 SY  --NEIL : Creat slightly improved with increased uo.   Continue IVF /HCO3 for now.   Continue Tristan for another day to monitor UO.  --Hyperkalemia : resolved.  No further tx.   Continue to hold ARB.  --Anemia of unclear etiology--send work up.    7/28 SY  --NEIL : Renal parameters holding fairly steady. Now off IVF and monitor    D/c Tristan  --Electrolytes improved and stable.  --Anemia : follow up SPEP/JAMIE,    7/29  Creat cont to improve  repeat K level in am  d/w Pt to avoid K concentrated foods  zeinab long d  spep pending    7/30 MK   - NEIL, improving monitor on po intake    spep etc pending   - hyperkalemia persists lokelma today    renal diet     bladder scan  - hypocalcemia ( [id number] 7.5) updated alb and check vit d and pth   - sepsis/ cellulitis    on renally dosed abx

## 2021-07-30 NOTE — PROGRESS NOTE ADULT - SUBJECTIVE AND OBJECTIVE BOX
CC:  Patient is a 56y old  Female who presents with a chief complaint of acute renal failure (29 Jul 2021 13:58)    SUBJECTIVE:     -no new complaints or issues at current time.    ROS:  all other review of systems are negative unless indicated above.    ALBUTerol    90 MICROgram(s) HFA Inhaler 2 Puff(s) Inhalation every 6 hours PRN  ammonium lactate 12% Lotion 1 Application(s) Topical two times a day  budesonide  80 MICROgram(s)/formoterol 4.5 MICROgram(s) Inhaler 2 Puff(s) Inhalation two times a day  cefTRIAXone Injectable. 1000 milliGRAM(s) IV Push <User Schedule>  doxycycline hyclate Capsule 100 milliGRAM(s) Oral every 12 hours  glucagon  Injectable 1 milliGRAM(s) IntraMuscular once  heparin   Injectable 5000 Unit(s) SubCutaneous every 8 hours  insulin lispro (ADMELOG) corrective regimen sliding scale   SubCutaneous Before meals and at bedtime  levothyroxine 25 MICROGram(s) Oral daily  methylPREDNISolone sodium succinate Injectable 20 milliGRAM(s) IV Push every 12 hours  metoprolol succinate ER 50 milliGRAM(s) Oral daily  nicotine - 21 mG/24Hr(s) Patch 1 patch Transdermal daily  nystatin Powder 1 Application(s) Topical two times a day  pantoprazole    Tablet 40 milliGRAM(s) Oral before breakfast    ICU Vital Signs Last 24 Hrs  T(C): 36.8 (30 Jul 2021 08:57), Max: 36.8 (30 Jul 2021 08:57)  T(F): 98.2 (30 Jul 2021 08:57), Max: 98.2 (30 Jul 2021 08:57)  HR: 99 (30 Jul 2021 08:57) (86 - 99)  BP: 147/66 (30 Jul 2021 08:57) (121/49 - 147/66)  BP(mean): --  ABP: --  ABP(mean): --  RR: 18 (30 Jul 2021 09:17) (18 - 20)  SpO2: 97% (30 Jul 2021 09:17) (90% - 98%)      Constitutional: NAD.   HEENT: PERRL, EOMI, MMM.  Neck: Soft and supple, No carotid bruit, No JVD  Respiratory: Breath sounds are clear bilaterally, No wheezing, rales or rhonchi  Cardiovascular: S1 and S2, regular rate and rhythm, no murmur, rub or gallop.  Gastrointestinal: Bowel Sounds present, soft, nontender, nondistended, no guarding, no rebound, no mass.  Extremities: No peripheral edema  Vascular: 2+ peripheral pulses  Neurological: A/O x , no focal deficits  Musculoskeletal: 5/5 strength b/l upper and lower extremities  Skin:  no visible rashes.                         7.4    6.36  )-----------( 158      ( 29 Jul 2021 08:11 )             25.1       07-29    135  |  104  |  71<H>  ----------------------------<  195<H>  5.5<H>   |  25  |  2.43<H>    Ca    6.6<L>      29 Jul 2021 08:11  Phos  4.3     07-29  Mg     2.0     07-29    Culture - Urine (07.25.21 @ 22:31)   Specimen Source: .Urine Clean Catch (Midstream)   Culture Results:   No growth   Culture - Blood (07.25.21 @ 22:02)   Specimen Source: .Blood None   Culture Results:   No growth to date.   Culture - Blood (07.25.21 @ 21:05)   Specimen Source: .Blood Blood-Peripheral   Culture Results:   No growth to date.     < from: US Duplex Arterial Lower Ext Compl, Bilateral (07.26.21 @ 14:04) >  IMPRESSION: Both anterior tibial arteries are not visualized. Tardus parvus waveforms in the left and dorsalis pedis artery, possibly hemodynamically significant stenosis or occlusion in the anterior tibial arter    < end of copied text >    < end of copied text >  < from: US Duplex Venous Lower Ext Complete, Bilateral (07.26.21 @ 13:58) >    IMPRESSION:  No evidence of deep venous thrombosis in either lower extremity.    < end of copied text >  < from: CT Abdomen and Pelvis No Cont (07.25.21 @ 23:52) >  IMPRESSION:    Cardiomegaly and trace pericardial fluid.    Cirrhosis and splenomegaly.    Stable left adnexal mass.    Mildly prominent bilateral pelvic obturator and external iliac lymph nodes are unchanged.    < end of copied text >  < from: Xray Chest 1 View-PORTABLE IMMEDIATE (Xray Chest 1 View-PORTABLE IMMEDIATE .) (07.25.21 @ 21:56) >  IMPRESSION: Heart enlargement. Possible congenital or valvular heart disease. Mild central CHF.    < end of copied text >    `< from: 12 Lead ECG (07.26.21 @ 07:41) >  Diagnosis Line Normal sinus rhythm  Low voltage QRS  Borderline ECG  No previous ECGs available

## 2021-07-31 VITALS
DIASTOLIC BLOOD PRESSURE: 69 MMHG | HEART RATE: 91 BPM | TEMPERATURE: 98 F | SYSTOLIC BLOOD PRESSURE: 102 MMHG | OXYGEN SATURATION: 98 % | RESPIRATION RATE: 18 BRPM

## 2021-07-31 LAB
C DIFF BY PCR RESULT: SIGNIFICANT CHANGE UP
C DIFF TOX GENS STL QL NAA+PROBE: SIGNIFICANT CHANGE UP
CA-I BLD-SCNC: 1.01 MMOL/L — LOW (ref 1.12–1.3)
CULTURE RESULTS: SIGNIFICANT CHANGE UP
CULTURE RESULTS: SIGNIFICANT CHANGE UP
SPECIMEN SOURCE: SIGNIFICANT CHANGE UP
SPECIMEN SOURCE: SIGNIFICANT CHANGE UP

## 2021-07-31 PROCEDURE — 99232 SBSQ HOSP IP/OBS MODERATE 35: CPT

## 2021-07-31 RX ADMIN — Medication 1 PATCH: at 09:34

## 2021-07-31 RX ADMIN — Medication 1 TABLET(S): at 09:25

## 2021-07-31 RX ADMIN — PANTOPRAZOLE SODIUM 40 MILLIGRAM(S): 20 TABLET, DELAYED RELEASE ORAL at 06:53

## 2021-07-31 RX ADMIN — Medication 1 PATCH: at 09:25

## 2021-07-31 RX ADMIN — Medication 0.25 MILLIGRAM(S): at 09:24

## 2021-07-31 RX ADMIN — Medication 50 MILLIGRAM(S): at 09:24

## 2021-07-31 RX ADMIN — Medication 6: at 13:12

## 2021-07-31 RX ADMIN — Medication 40 MILLIGRAM(S): at 06:53

## 2021-07-31 RX ADMIN — Medication 100 MILLIGRAM(S): at 09:24

## 2021-07-31 RX ADMIN — BUDESONIDE AND FORMOTEROL FUMARATE DIHYDRATE 2 PUFF(S): 160; 4.5 AEROSOL RESPIRATORY (INHALATION) at 08:29

## 2021-07-31 RX ADMIN — Medication 25 MICROGRAM(S): at 06:52

## 2021-07-31 RX ADMIN — HEPARIN SODIUM 5000 UNIT(S): 5000 INJECTION INTRAVENOUS; SUBCUTANEOUS at 14:31

## 2021-07-31 RX ADMIN — ALBUTEROL 2 PUFF(S): 90 AEROSOL, METERED ORAL at 08:30

## 2021-07-31 RX ADMIN — HEPARIN SODIUM 5000 UNIT(S): 5000 INJECTION INTRAVENOUS; SUBCUTANEOUS at 06:53

## 2021-07-31 RX ADMIN — Medication 1 APPLICATION(S): at 09:24

## 2021-07-31 RX ADMIN — NYSTATIN CREAM 1 APPLICATION(S): 100000 CREAM TOPICAL at 09:25

## 2021-07-31 NOTE — PROGRESS NOTE ADULT - PROVIDER SPECIALTY LIST ADULT
Critical Care
Hospitalist
Nephrology
Critical Care
Hospitalist
Hospitalist
Infectious Disease
Nephrology
Infectious Disease
Critical Care

## 2021-07-31 NOTE — PROGRESS NOTE ADULT - SUBJECTIVE AND OBJECTIVE BOX
CC:  Patient is a 56y old  Female who presents with a chief complaint of acute renal failure (29 Jul 2021 13:58)    SUBJECTIVE:     - no new events. Plan for dc later today. CDAD negative    ROS:  all other review of systems are negative unless indicated above.    ALBUTerol    90 MICROgram(s) HFA Inhaler 2 Puff(s) Inhalation every 6 hours PRN  ammonium lactate 12% Lotion 1 Application(s) Topical two times a day  budesonide  80 MICROgram(s)/formoterol 4.5 MICROgram(s) Inhaler 2 Puff(s) Inhalation two times a day  cefTRIAXone Injectable. 1000 milliGRAM(s) IV Push <User Schedule>  doxycycline hyclate Capsule 100 milliGRAM(s) Oral every 12 hours  glucagon  Injectable 1 milliGRAM(s) IntraMuscular once  heparin   Injectable 5000 Unit(s) SubCutaneous every 8 hours  insulin lispro (ADMELOG) corrective regimen sliding scale   SubCutaneous Before meals and at bedtime  levothyroxine 25 MICROGram(s) Oral daily  methylPREDNISolone sodium succinate Injectable 20 milliGRAM(s) IV Push every 12 hours  metoprolol succinate ER 50 milliGRAM(s) Oral daily  nicotine - 21 mG/24Hr(s) Patch 1 patch Transdermal daily  nystatin Powder 1 Application(s) Topical two times a day  pantoprazole    Tablet 40 milliGRAM(s) Oral before breakfast    ICU Vital Signs Last 24 Hrs  T(C): 36.8 (30 Jul 2021 08:57), Max: 36.8 (30 Jul 2021 08:57)  T(F): 98.2 (30 Jul 2021 08:57), Max: 98.2 (30 Jul 2021 08:57)  HR: 99 (30 Jul 2021 08:57) (86 - 99)  BP: 147/66 (30 Jul 2021 08:57) (121/49 - 147/66)  BP(mean): --  ABP: --  ABP(mean): --  RR: 18 (30 Jul 2021 09:17) (18 - 20)  SpO2: 97% (30 Jul 2021 09:17) (90% - 98%)      Constitutional: NAD.   HEENT: PERRL, EOMI, MMM.  Neck: Soft and supple, No carotid bruit, No JVD  Respiratory: Breath sounds are clear bilaterally, No wheezing, rales or rhonchi  Cardiovascular: S1 and S2, regular rate and rhythm, no murmur, rub or gallop.  Gastrointestinal: Bowel Sounds present, soft, nontender, nondistended, no guarding, no rebound, no mass.  Extremities: No peripheral edema  Vascular: 2+ peripheral pulses  Neurological: A/O x , no focal deficits  Musculoskeletal: 5/5 strength b/l upper and lower extremities  Skin:  no visible rashes.                         7.4    6.36  )-----------( 158      ( 29 Jul 2021 08:11 )             25.1       07-29    135  |  104  |  71<H>  ----------------------------<  195<H>  5.5<H>   |  25  |  2.43<H>    Ca    6.6<L>      29 Jul 2021 08:11  Phos  4.3     07-29  Mg     2.0     07-29    Culture - Urine (07.25.21 @ 22:31)   Specimen Source: .Urine Clean Catch (Midstream)   Culture Results:   No growth   Culture - Blood (07.25.21 @ 22:02)   Specimen Source: .Blood None   Culture Results:   No growth to date.   Culture - Blood (07.25.21 @ 21:05)   Specimen Source: .Blood Blood-Peripheral   Culture Results:   No growth to date.     < from: US Duplex Arterial Lower Ext Compl, Bilateral (07.26.21 @ 14:04) >  IMPRESSION: Both anterior tibial arteries are not visualized. Tardus parvus waveforms in the left and dorsalis pedis artery, possibly hemodynamically significant stenosis or occlusion in the anterior tibial arter    < end of copied text >    < end of copied text >  < from: US Duplex Venous Lower Ext Complete, Bilateral (07.26.21 @ 13:58) >    IMPRESSION:  No evidence of deep venous thrombosis in either lower extremity.    < end of copied text >  < from: CT Abdomen and Pelvis No Cont (07.25.21 @ 23:52) >  IMPRESSION:    Cardiomegaly and trace pericardial fluid.    Cirrhosis and splenomegaly.    Stable left adnexal mass.    Mildly prominent bilateral pelvic obturator and external iliac lymph nodes are unchanged.    < end of copied text >  < from: Xray Chest 1 View-PORTABLE IMMEDIATE (Xray Chest 1 View-PORTABLE IMMEDIATE .) (07.25.21 @ 21:56) >  IMPRESSION: Heart enlargement. Possible congenital or valvular heart disease. Mild central CHF.    < end of copied text >    `< from: 12 Lead ECG (07.26.21 @ 07:41) >  Diagnosis Line Normal sinus rhythm  Low voltage QRS  Borderline ECG  No previous ECGs available

## 2021-07-31 NOTE — PROGRESS NOTE ADULT - REASON FOR ADMISSION
acute renal failure

## 2021-07-31 NOTE — PROGRESS NOTE ADULT - NSICDXPILOT_GEN_ALL_CORE
[place]

## 2021-08-02 LAB
INTERPRETATION 24H UR IFE-IMP: SIGNIFICANT CHANGE UP
INTERPRETATION 24H UR IFE-IMP: SIGNIFICANT CHANGE UP

## 2021-08-03 LAB
% ALBUMIN: 41.1 % — SIGNIFICANT CHANGE UP
% ALPHA 1: 5.2 % — SIGNIFICANT CHANGE UP
% ALPHA 2: 11.3 % — SIGNIFICANT CHANGE UP
% BETA: 10.5 % — SIGNIFICANT CHANGE UP
% GAMMA: 31.9 % — SIGNIFICANT CHANGE UP
ALBUMIN SERPL ELPH-MCNC: 3 G/DL — LOW (ref 3.6–5.5)
ALBUMIN/GLOB SERPL ELPH: 0.7 RATIO — SIGNIFICANT CHANGE UP
ALPHA1 GLOB SERPL ELPH-MCNC: 0.4 G/DL — SIGNIFICANT CHANGE UP (ref 0.1–0.4)
ALPHA2 GLOB SERPL ELPH-MCNC: 0.8 G/DL — SIGNIFICANT CHANGE UP (ref 0.5–1)
B-GLOBULIN SERPL ELPH-MCNC: 0.8 G/DL — SIGNIFICANT CHANGE UP (ref 0.5–1)
GAMMA GLOBULIN: 2.3 G/DL — HIGH (ref 0.6–1.6)
INTERPRETATION SERPL IFE-IMP: SIGNIFICANT CHANGE UP
PROT PATTERN SERPL ELPH-IMP: SIGNIFICANT CHANGE UP

## 2021-08-04 DIAGNOSIS — L03.116 CELLULITIS OF LEFT LOWER LIMB: ICD-10-CM

## 2021-08-04 DIAGNOSIS — A41.9 SEPSIS, UNSPECIFIED ORGANISM: ICD-10-CM

## 2021-08-04 DIAGNOSIS — E87.2 ACIDOSIS: ICD-10-CM

## 2021-08-04 DIAGNOSIS — R65.20 SEVERE SEPSIS WITHOUT SEPTIC SHOCK: ICD-10-CM

## 2021-08-04 DIAGNOSIS — I10 ESSENTIAL (PRIMARY) HYPERTENSION: ICD-10-CM

## 2021-08-04 DIAGNOSIS — Z79.84 LONG TERM (CURRENT) USE OF ORAL HYPOGLYCEMIC DRUGS: ICD-10-CM

## 2021-08-04 DIAGNOSIS — L03.115 CELLULITIS OF RIGHT LOWER LIMB: ICD-10-CM

## 2021-08-04 DIAGNOSIS — E11.649 TYPE 2 DIABETES MELLITUS WITH HYPOGLYCEMIA WITHOUT COMA: ICD-10-CM

## 2021-08-04 DIAGNOSIS — J44.1 CHRONIC OBSTRUCTIVE PULMONARY DISEASE WITH (ACUTE) EXACERBATION: ICD-10-CM

## 2021-08-04 DIAGNOSIS — I87.2 VENOUS INSUFFICIENCY (CHRONIC) (PERIPHERAL): ICD-10-CM

## 2021-08-04 DIAGNOSIS — F17.210 NICOTINE DEPENDENCE, CIGARETTES, UNCOMPLICATED: ICD-10-CM

## 2021-08-04 DIAGNOSIS — D63.1 ANEMIA IN CHRONIC KIDNEY DISEASE: ICD-10-CM

## 2021-08-04 DIAGNOSIS — E87.5 HYPERKALEMIA: ICD-10-CM

## 2021-08-04 DIAGNOSIS — N17.9 ACUTE KIDNEY FAILURE, UNSPECIFIED: ICD-10-CM

## 2021-08-10 VITALS
BODY MASS INDEX: 44.37 KG/M2 | WEIGHT: 235 LBS | HEIGHT: 61 IN | SYSTOLIC BLOOD PRESSURE: 122 MMHG | TEMPERATURE: 96.4 F | DIASTOLIC BLOOD PRESSURE: 54 MMHG

## 2022-04-04 NOTE — H&P ADULT - NSHPSOURCEINFORD_GEN_ALL_CORE
Patient You can access the FollowMyHealth Patient Portal offered by [facility] by registering at the following website: [email or web address]. By joining [employer]’s FollowMyHealth portal, you will also be able to view your health information using other applications (apps) compatible with our system.

## 2022-05-04 NOTE — HISTORY OF PRESENT ILLNESS
[FreeTextEntry1] : 55yo  LMP age 46 presents with with referral from Dr. Cuellar for growth on ovary. Patient reports c/o bloating for a few months now. She underwent CT scan on 19 revealing 5.6cm left adnexal mas containing dystrophic calcification suggestive of a neoplasm. She denies any pelvic pain, abnormal bleeding or changes in bowel/bladder habits. \par \par LPAP- overdue, plans to see Dr. Salmon before the end of this year for PAP\par LMammo- never\par LColonoscopy- never\par 
Not applicable

## 2023-02-08 NOTE — PROGRESS NOTE ADULT - ASSESSMENT
57 y/o female, poor historian, with pmhx of DM on glipizide presents to ER c/o weakness and lethargy. Found to be hypoglycemic to 50 in field, received amp of D50 and improved to 84 by arrival to ER. States she has been having diarrhea for last few days. Denies any changes in urination, abdominal pain, shortness of breath, chest pain, headache, fevers, cough.States she took normal dose of glipizide this morning and has not had decreased PO intake. Very poorly kept with severe venous stasis dermatitis. was on chronic keflex and states it was helping but she stopped taking it. Found to be in acute renal failure with ph of 7.09, Cr 2.9, K 6.5, serum CO2 of 12. received calcium, albuterol, and bicarb in ER. Has chronic LE stasis dermatitis weeping wounds with foul smell, concern for cellulitis, given vanco/rocephin.     b/l LE cellulitis upon chronic stasis dermatitis.  - slowly improving.  - BCx:  no growth.  - US:  no DVT.  - DC ceftriaxone and vancomycin.  - start:  doxycycline 100mg po bid x 7 days total      NEIL + AGMA + hyperkalemia.  - Cr 2.43, modest improvement (07/25 2.98).  - DC'd  metformin, losartan and supplemental KCl.  - DC'd Tristan.  - s/p Kayexalate and HCO3 gtt.  - Nephrology f/u outpatient.  - Lokelma m/w/f x 1 week and needs close f/u with pcp for bmp repeat  - strict potassium restricted diet    acute COPD exacerbation.  - document room air O2 at rest and upon ambulation.  - DC SoluMedrol.  - start:  prednisone 40mg po qd x 4 more days, then DC.  - SHANNON MDI.  - ICS/LABA.  - Pulmonology f/u outpatient.    normocytic anemia.  - suspect AOCD.  - stool OB:  negative.  - Hbg 7.4g/dL, stable.  - SPEP/JAMIE:  pending.  - Nephrology f/u outpatient.    tardus parvus waveforms L DP artery.  - arterial US:  possible hemodynamically significant stenosis or occlusion in the anterior tibial artery.  - no intervention planned.  - VSx input noted.    hx DM2.  - A1C:  4.7%.  - FS target 140-180mg/dL.  - consisent CHO diet.  - DC metformin.  - resume glipizide.  - PMD f/u outpatient.    DVT prophylaxis.  - UFH sq.    disposition.  - 2S.  - plan to [place] home today w/ home care.    communication.  - 2S RN.  - CM 1 Principal Discharge DX:	Adjustment disorder with depressed mood

## 2023-04-17 ENCOUNTER — NON-APPOINTMENT (OUTPATIENT)
Age: 58
End: 2023-04-17

## 2023-04-17 DIAGNOSIS — Z86.39 PERSONAL HISTORY OF OTHER ENDOCRINE, NUTRITIONAL AND METABOLIC DISEASE: ICD-10-CM

## 2023-04-17 DIAGNOSIS — Z86.19 PERSONAL HISTORY OF OTHER INFECTIOUS AND PARASITIC DISEASES: ICD-10-CM

## 2023-04-17 DIAGNOSIS — N17.9 ACUTE KIDNEY FAILURE, UNSPECIFIED: ICD-10-CM

## 2023-04-17 RX ORDER — BUPROPION HYDROCHLORIDE 150 MG/1
150 TABLET, FILM COATED, EXTENDED RELEASE ORAL DAILY
Refills: 0 | Status: ACTIVE | COMMUNITY

## 2023-04-17 RX ORDER — MULTIVIT-MIN/FA/LYCOPEN/LUTEIN .4-300-25
TABLET ORAL DAILY
Refills: 0 | Status: ACTIVE | COMMUNITY

## 2023-04-17 RX ORDER — BUDESONIDE AND FORMOTEROL FUMARATE DIHYDRATE 80; 4.5 UG/1; UG/1
80-4.5 AEROSOL RESPIRATORY (INHALATION) TWICE DAILY
Refills: 0 | Status: ACTIVE | COMMUNITY

## 2023-04-17 RX ORDER — PANTOPRAZOLE 20 MG/1
20 TABLET, DELAYED RELEASE ORAL TWICE DAILY
Refills: 0 | Status: ACTIVE | COMMUNITY
Start: 2019-10-24

## 2023-04-17 RX ORDER — SODIUM ZIRCONIUM CYCLOSILICATE 5 G/5G
5 POWDER, FOR SUSPENSION ORAL
Refills: 0 | Status: ACTIVE | COMMUNITY

## 2023-04-17 RX ORDER — MULTIVIT-MIN/FOLIC/VIT K/LYCOP 400-300MCG
1000 TABLET ORAL DAILY
Refills: 0 | Status: ACTIVE | COMMUNITY

## 2023-04-17 RX ORDER — LEVOTHYROXINE SODIUM 0.03 MG/1
25 TABLET ORAL DAILY
Refills: 0 | Status: ACTIVE | COMMUNITY
Start: 2019-10-24

## 2023-04-17 RX ORDER — L. ACIDOPHILUS/L.BULGARICUS 1MM CELL
TABLET ORAL
Refills: 0 | Status: ACTIVE | COMMUNITY

## 2023-04-17 RX ORDER — ALBUTEROL SULFATE 90 UG/1
108 (90 BASE) INHALANT RESPIRATORY (INHALATION) EVERY 6 HOURS
Refills: 0 | Status: ACTIVE | COMMUNITY

## 2023-04-17 RX ORDER — GLIPIZIDE 5 MG/1
5 TABLET ORAL DAILY
Refills: 0 | Status: ACTIVE | COMMUNITY

## 2023-04-17 RX ORDER — METOPROLOL SUCCINATE 100 MG/1
100 TABLET, EXTENDED RELEASE ORAL DAILY
Refills: 0 | Status: ACTIVE | COMMUNITY

## 2023-04-17 RX ORDER — OMEGA-3/DHA/EPA/FISH OIL 300-1000MG
1000 CAPSULE ORAL TWICE DAILY
Refills: 0 | Status: ACTIVE | COMMUNITY

## 2023-04-17 RX ORDER — DOXYCYCLINE HYCLATE 100 MG/1
100 CAPSULE ORAL
Refills: 0 | Status: ACTIVE | COMMUNITY

## 2023-08-03 NOTE — PROGRESS NOTE ADULT - NEGATIVE NEUROLOGICAL SYMPTOMS
Behavioral Discharge Planning and Instructions  THANK YOU FOR CHOOSING [address]  [phone]    Summary: You were admitted to [facility] on 8/2/23 for detoxification from Alcohol.  A medical exam was performed that included lab work. You have met with a  and opted to transfer to in patient treatment.  Please take care and make your recovery a daily priority, Durga!  It was a pleasure working with you and the entire treatment team here wishes you the very best in your recovery!     Recommendation:  Please seek CD treatment as needed and required. Please follow any and all recommendations as needed and requested.     [facility]   [address] [place] [address]  [phone]    [facility]   [address], [place] [address] [phone]    Main Diagnoses:  Per Dr Aj  Alcohol use disorder, severe  Alcohol withdrawal with unspecified complication   MDD, recurrent, moderate  Nicotine dependence with current use       Major Treatments, Procedures and Findings:  You were treated for Alcohol detoxification using Valium . As an outpatient you will be prescribed naltrexone,  please take this medication as prescribed until it is gone. You completed a chemical dependency assessment. You had labs drawn and those results were reviewed with you. Please take a copy of your lab work with you to your next primary care provider appointment.    Symptoms to Report:  If you experience more anxiety, confusion, sleeplessness, deep sadness or thoughts of suicide, notify your treatment team or notify your primary care provider. IF ANY OF THE SYMPTOMS YOU ARE EXPERIENCING ARE A MEDICAL EMERGENCY CALL 911 IMMEDIATELY.     Lifestyle Adjustment: Adjust your lifestyle to get enough sleep, relaxation, exercise and good nutrition. Continue to develop healthy coping skills to decrease stress and promote a sober living environment. Do not use mood altering substances including alcohol, illegal drugs 
or addictive medications other than what is currently prescribed.     Disposition: In patient treatment - [facility] ([place], [place])    Facts about COVID19 at www.cdc.gov/COVID19 and www.[place].gov/covid19    Keeping hands clean is one of the most important steps we can take to avoid getting sick and spreading germs to others.  Please wash your hands frequently and lather with soap for at least 20 seconds!    Follow-up Appointment:   Date: Tuesday, 8/8/2023  Time: 12:00 pm - 1:00 pm  Provider: Bashir Nath MA  Supervised by: Joselin Hoang MA  LMFT  Location: [facility], [address], [place] [address]  Phone: [phone]  Type: Teletherapy    Recovery apps for your phone to locate current in person and zoom recovery meetings  [place] - meeting [place]  AA  - meeting [place]  Meeting guide - meeting [place]  Quick NA meeting - meeting [place]  [place]da- has various apps    Resources:  Some AA/NA meetings are being held online however most have returned to in-person or a hybrid combination please check online to verify  Need Support Now? If you or someone you know is struggling or in crisis, help is available. Call or text [employer] or chat [employer].org  AA meetings search for them at: https://aa-intergroup.org (worldwide meeting listings)  AA meetings for [place] area can be found online at: https://a[place].org (click local online meetings listings)  NA meetings for [place] area can be found online at: https://www.nam[place].org  (click find a meeting)  AA and NA Sponsors are excellent resources for support and you can find one at any support group meeting.   Alcoholics Anonymous (https://aa.org/): for information 24 hours/day  AA Intergroup service office in [place] (http://www.aastpaul.org/) 525.436.1023  AA Intergroup service office in [facility]: 877.862.1301. (http://www.a[place].org/)  Narcotics Anonymous (www.nam[place].org) (916) 
667-2967  https://aafairviewriverside.org/meetings  SMART Recovery - self management for addiction recovery:  www.smartrecovery.org  Pathways ~ A Health Crisis Resource & Support Center:  884.664.9038.  https://prescribetoprevent.org/patient-education/videos/  http://www.harmreduction.org  [facility] 785-673-9439  Support Group:  AA/NA and Sponsor/support.  National [place] on Mental Illness (www.[place]ionne.org): 595.897.3269 or 876-169-3165.  Alcoholics Anonymous (www.alcoholics-anonymous.org): Check your phone book for your local chapter.  Suicide Awareness Voices of Education (SAVE) (www.save.org): 108-929-LTAQ (2465)  National Suicide Prevention Line (www.mentalhealthmn.org): 164-562-NQMI (2890)  Mental Health Consumer/Survivor Network of [place] (www.mhcsn.net): 142.266.2654 or 241-612-8711  Mental Health Association of [place] (www.mentalhealth.org): 653.387.2888 or 232-113-0372   Substance Abuse and Mental Health Services (www.samhsa.gov)  [place] Opioid Prevention Coalition: www.opioidcoalition.org    [place] Recovery Connection (MRC)  [facility] connects people seeking recovery to resources that help foster and sustain long-term recovery.  Whether you are seeking resources for treatment, transportation, housing, job training, education, health care or other pathways to recovery, [facility] is a great place to start.  163.754.7836.  www.[employer].org    Great Pod casts for nutrition and wellness  Listen on Apple Podcasts  Dishing Up Nutrition   [employer]   Nutrition       Understand the connection between what you eat and how you feel. Hosted by licensed nutritionists and dietitians from [employer] we share practical, real-life solutions for healthier living through nutrition.     General Medication Instructions:   See your medication sheet(s) for instructions.   Take all medications as prescribed.  Make no changes unless your primary care provider suggests 
them.   Go to all your primary care provider visits.  Be sure to have all your required lab tests. This way, your medicines can be refilled on time.  Do not use any forms of alcohol.    Please Note:  If you have any questions at anytime after you are discharged please call [facility] detox unit [facility] at [phone].  [facility], Behavioral Intake [phone]  Medical Records call [phone]  Outpatient Behavioral Intake call [phone]  LP+ Wait List/Bed Availability call [phone]    Please remember to take all of your behavioral discharge planning and lab paperwork to any follow up appointments, it contains your lab results, diagnosis, medication list and discharge recommendations.      THANK YOU FOR CHOOSING [facility]   
no weakness
no weakness

## 2025-01-23 NOTE — SEPSIS NOTE - PATIENT ON (OXYGEN DELIVERY METHOD)
ED navigator second attempt to contact patient to assist with scheduling a post ED 7 day follow up with PCP. Unable to reach patient. ED navigator will close encounter at this time.    Santa Alvarez ED Navigator   [phone]      
room air